# Patient Record
Sex: FEMALE
[De-identification: names, ages, dates, MRNs, and addresses within clinical notes are randomized per-mention and may not be internally consistent; named-entity substitution may affect disease eponyms.]

---

## 2023-05-31 ENCOUNTER — APPOINTMENT (OUTPATIENT)
Dept: NEUROLOGY | Facility: CLINIC | Age: 78
End: 2023-05-31
Payer: MEDICARE

## 2023-05-31 VITALS
WEIGHT: 128 LBS | HEART RATE: 96 BPM | HEIGHT: 62 IN | DIASTOLIC BLOOD PRESSURE: 71 MMHG | SYSTOLIC BLOOD PRESSURE: 121 MMHG | BODY MASS INDEX: 23.55 KG/M2

## 2023-05-31 DIAGNOSIS — R26.81 UNSTEADINESS ON FEET: ICD-10-CM

## 2023-05-31 DIAGNOSIS — R41.89 OTHER SYMPTOMS AND SIGNS INVOLVING COGNITIVE FUNCTIONS AND AWARENESS: ICD-10-CM

## 2023-05-31 PROBLEM — Z00.00 ENCOUNTER FOR PREVENTIVE HEALTH EXAMINATION: Status: ACTIVE | Noted: 2023-05-31

## 2023-05-31 PROCEDURE — ZZZZZ: CPT

## 2023-05-31 NOTE — ASSESSMENT
[FreeTextEntry1] : Cognitive Impairment / Unsteady Gait. \par \par - MRI of The Brain without contrast.\par - EEG.\par - Multivitamin Daily. \par - GAIT Training Fall prevention. \par - Blood Work - We will review what patients doctor sent her for.\par - I Will Follow up with her in One Month.\par \par \par \par \par \par I, Alisha Olivarez, Attest that this documentation has been prepared under the direction and in the presence of Provider Tonny Beltrán DO\par \par Thank You for letting me assist in the management of this patient. \par \par Tonny Beltrán DO\par Board Certified, Neurology\par

## 2023-05-31 NOTE — HISTORY OF PRESENT ILLNESS
[FreeTextEntry1] : Ms. Joe is a 78 year old woman who comes in for short term memory loss. This has been worsening over the course of last year. Patients appetite was suppressed for the last year as well. Patient is able to do at home activities. Patient takes Lexapro with no complaints. No recent imaging. No previous treatment. Patients depression level is a 2 out of 10. Patient does not o out of the house. Patient is not under the care with therapist or psychiatrist but was advised to follow up with one. Rule out secondary. She does not take daily vitamin supplement. Patient will start to exercise more. When it comes to sleep patient does not have good quality of sleep. She wakes up two times throughout her night. We will send her for fall prevention and gait training. \par Neurological Exam - Normal. \par - Anatomy for age decline and moderate depression.\par Patient does not show anatomy of dementia.

## 2023-07-10 ENCOUNTER — APPOINTMENT (OUTPATIENT)
Dept: NEUROLOGY | Facility: CLINIC | Age: 78
End: 2023-07-10

## 2023-07-13 ENCOUNTER — APPOINTMENT (OUTPATIENT)
Dept: NEUROLOGY | Facility: CLINIC | Age: 78
End: 2023-07-13

## 2025-02-20 ENCOUNTER — INPATIENT (INPATIENT)
Facility: HOSPITAL | Age: 80
LOS: 10 days | Discharge: HOME | DRG: 871 | End: 2025-03-03
Attending: INTERNAL MEDICINE | Admitting: INTERNAL MEDICINE
Payer: MEDICARE

## 2025-02-20 VITALS
TEMPERATURE: 99 F | RESPIRATION RATE: 24 BRPM | OXYGEN SATURATION: 96 % | WEIGHT: 130.07 LBS | HEIGHT: 62 IN | DIASTOLIC BLOOD PRESSURE: 70 MMHG | HEART RATE: 84 BPM | SYSTOLIC BLOOD PRESSURE: 120 MMHG

## 2025-02-20 LAB
ALBUMIN SERPL ELPH-MCNC: 4 G/DL — SIGNIFICANT CHANGE UP (ref 3.5–5.2)
ALP SERPL-CCNC: 94 U/L — SIGNIFICANT CHANGE UP (ref 30–115)
ALT FLD-CCNC: 12 U/L — SIGNIFICANT CHANGE UP (ref 0–41)
ANION GAP SERPL CALC-SCNC: 12 MMOL/L — SIGNIFICANT CHANGE UP (ref 7–14)
AST SERPL-CCNC: 31 U/L — SIGNIFICANT CHANGE UP (ref 0–41)
BASE EXCESS BLDV CALC-SCNC: 3 MMOL/L — SIGNIFICANT CHANGE UP (ref -2–3)
BASE EXCESS BLDV CALC-SCNC: 8.2 MMOL/L — HIGH (ref -2–3)
BASOPHILS # BLD AUTO: 0.02 K/UL — SIGNIFICANT CHANGE UP (ref 0–0.2)
BASOPHILS NFR BLD AUTO: 0.2 % — SIGNIFICANT CHANGE UP (ref 0–1)
BILIRUB SERPL-MCNC: 0.3 MG/DL — SIGNIFICANT CHANGE UP (ref 0.2–1.2)
BUN SERPL-MCNC: 33 MG/DL — HIGH (ref 10–20)
CA-I SERPL-SCNC: 1.17 MMOL/L — SIGNIFICANT CHANGE UP (ref 1.15–1.33)
CA-I SERPL-SCNC: 1.18 MMOL/L — SIGNIFICANT CHANGE UP (ref 1.15–1.33)
CALCIUM SERPL-MCNC: 8.7 MG/DL — SIGNIFICANT CHANGE UP (ref 8.4–10.4)
CHLORIDE SERPL-SCNC: 96 MMOL/L — LOW (ref 98–110)
CO2 SERPL-SCNC: 31 MMOL/L — SIGNIFICANT CHANGE UP (ref 17–32)
CREAT SERPL-MCNC: 1.6 MG/DL — HIGH (ref 0.7–1.5)
EGFR: 32 ML/MIN/1.73M2 — LOW
EGFR: 32 ML/MIN/1.73M2 — LOW
EOSINOPHIL # BLD AUTO: 0.01 K/UL — SIGNIFICANT CHANGE UP (ref 0–0.7)
EOSINOPHIL NFR BLD AUTO: 0.1 % — SIGNIFICANT CHANGE UP (ref 0–8)
FLUAV AG NPH QL: SIGNIFICANT CHANGE UP
FLUBV AG NPH QL: SIGNIFICANT CHANGE UP
GAS PNL BLDV: 135 MMOL/L — LOW (ref 136–145)
GAS PNL BLDV: 137 MMOL/L — SIGNIFICANT CHANGE UP (ref 136–145)
GAS PNL BLDV: SIGNIFICANT CHANGE UP
GLUCOSE SERPL-MCNC: 113 MG/DL — HIGH (ref 70–99)
HCO3 BLDV-SCNC: 37 MMOL/L — HIGH (ref 22–29)
HCO3 BLDV-SCNC: 39 MMOL/L — HIGH (ref 22–29)
HCT VFR BLD CALC: 43.3 % — SIGNIFICANT CHANGE UP (ref 37–47)
HCT VFR BLDA CALC: 38 % — SIGNIFICANT CHANGE UP (ref 34.5–46.5)
HGB BLD CALC-MCNC: 12.5 G/DL — SIGNIFICANT CHANGE UP (ref 11.7–16.1)
HGB BLD-MCNC: 13 G/DL — SIGNIFICANT CHANGE UP (ref 12–16)
IMM GRANULOCYTES NFR BLD AUTO: 0.4 % — HIGH (ref 0.1–0.3)
LACTATE BLDV-MCNC: 1.6 MMOL/L — SIGNIFICANT CHANGE UP (ref 0.5–2)
LACTATE BLDV-MCNC: 2.4 MMOL/L — HIGH (ref 0.5–2)
LYMPHOCYTES # BLD AUTO: 0.65 K/UL — LOW (ref 1.2–3.4)
LYMPHOCYTES # BLD AUTO: 6.1 % — LOW (ref 20.5–51.1)
MCHC RBC-ENTMCNC: 27.1 PG — SIGNIFICANT CHANGE UP (ref 27–31)
MCHC RBC-ENTMCNC: 30 G/DL — LOW (ref 32–37)
MCV RBC AUTO: 90.2 FL — SIGNIFICANT CHANGE UP (ref 81–99)
MONOCYTES # BLD AUTO: 0.85 K/UL — HIGH (ref 0.1–0.6)
MONOCYTES NFR BLD AUTO: 7.9 % — SIGNIFICANT CHANGE UP (ref 1.7–9.3)
NEUTROPHILS # BLD AUTO: 9.13 K/UL — HIGH (ref 1.4–6.5)
NEUTROPHILS NFR BLD AUTO: 85.3 % — HIGH (ref 42.2–75.2)
NRBC BLD AUTO-RTO: 0 /100 WBCS — SIGNIFICANT CHANGE UP (ref 0–0)
NT-PROBNP SERPL-SCNC: 2979 PG/ML — HIGH (ref 0–300)
PCO2 BLDV: 118 MMHG — CRITICAL HIGH (ref 39–42)
PCO2 BLDV: 96 MMHG — CRITICAL HIGH (ref 39–42)
PH BLDV: 7.1 — CRITICAL LOW (ref 7.32–7.43)
PH BLDV: 7.22 — LOW (ref 7.32–7.43)
PLATELET # BLD AUTO: 198 K/UL — SIGNIFICANT CHANGE UP (ref 130–400)
PMV BLD: 9 FL — SIGNIFICANT CHANGE UP (ref 7.4–10.4)
PO2 BLDV: 29 MMHG — SIGNIFICANT CHANGE UP (ref 25–45)
PO2 BLDV: 59 MMHG — HIGH (ref 25–45)
POTASSIUM BLDV-SCNC: 4.7 MMOL/L — SIGNIFICANT CHANGE UP (ref 3.5–5.1)
POTASSIUM BLDV-SCNC: 5.7 MMOL/L — HIGH (ref 3.5–5.1)
POTASSIUM SERPL-MCNC: 5.2 MMOL/L — HIGH (ref 3.5–5)
POTASSIUM SERPL-SCNC: 5.2 MMOL/L — HIGH (ref 3.5–5)
PROT SERPL-MCNC: 7.2 G/DL — SIGNIFICANT CHANGE UP (ref 6–8)
RBC # BLD: 4.8 M/UL — SIGNIFICANT CHANGE UP (ref 4.2–5.4)
RBC # FLD: 13.1 % — SIGNIFICANT CHANGE UP (ref 11.5–14.5)
RSV RNA NPH QL NAA+NON-PROBE: DETECTED
SAO2 % BLDV: 44.4 % — LOW (ref 67–88)
SARS-COV-2 RNA SPEC QL NAA+PROBE: SIGNIFICANT CHANGE UP
SODIUM SERPL-SCNC: 139 MMOL/L — SIGNIFICANT CHANGE UP (ref 135–146)
TROPONIN T, HIGH SENSITIVITY RESULT: 35 NG/L — HIGH (ref 6–13)
WBC # BLD: 10.7 K/UL — SIGNIFICANT CHANGE UP (ref 4.8–10.8)
WBC # FLD AUTO: 10.7 K/UL — SIGNIFICANT CHANGE UP (ref 4.8–10.8)

## 2025-02-20 PROCEDURE — 70450 CT HEAD/BRAIN W/O DYE: CPT | Mod: 26

## 2025-02-20 PROCEDURE — 93010 ELECTROCARDIOGRAM REPORT: CPT

## 2025-02-20 PROCEDURE — 99291 CRITICAL CARE FIRST HOUR: CPT

## 2025-02-20 PROCEDURE — 76604 US EXAM CHEST: CPT | Mod: 26

## 2025-02-20 PROCEDURE — 71045 X-RAY EXAM CHEST 1 VIEW: CPT | Mod: 26

## 2025-02-20 PROCEDURE — 71275 CT ANGIOGRAPHY CHEST: CPT | Mod: 26

## 2025-02-20 PROCEDURE — 93308 TTE F-UP OR LMTD: CPT | Mod: 26

## 2025-02-20 RX ORDER — LORAZEPAM 4 MG/ML
2 VIAL (ML) INJECTION ONCE
Refills: 0 | Status: DISCONTINUED | OUTPATIENT
Start: 2025-02-20 | End: 2025-02-20

## 2025-02-20 RX ORDER — ONDANSETRON HCL/PF 4 MG/2 ML
4 VIAL (ML) INJECTION ONCE
Refills: 0 | Status: COMPLETED | OUTPATIENT
Start: 2025-02-20 | End: 2025-02-20

## 2025-02-20 RX ORDER — SODIUM CHLORIDE 9 G/1000ML
1000 INJECTION, SOLUTION INTRAVENOUS ONCE
Refills: 0 | Status: COMPLETED | OUTPATIENT
Start: 2025-02-20 | End: 2025-02-20

## 2025-02-20 RX ORDER — DEXMEDETOMIDINE HYDROCHLORIDE IN SODIUM CHLORIDE 4 UG/ML
0.2 INJECTION INTRAVENOUS
Qty: 400 | Refills: 0 | Status: DISCONTINUED | OUTPATIENT
Start: 2025-02-20 | End: 2025-02-20

## 2025-02-20 RX ORDER — DEXAMETHASONE 0.5 MG/1
10 TABLET ORAL ONCE
Refills: 0 | Status: COMPLETED | OUTPATIENT
Start: 2025-02-20 | End: 2025-02-20

## 2025-02-20 RX ORDER — IPRATROPIUM BROMIDE AND ALBUTEROL SULFATE .5; 2.5 MG/3ML; MG/3ML
3 SOLUTION RESPIRATORY (INHALATION)
Refills: 0 | Status: COMPLETED | OUTPATIENT
Start: 2025-02-20 | End: 2025-02-20

## 2025-02-20 RX ADMIN — SODIUM CHLORIDE 1000 MILLILITER(S): 9 INJECTION, SOLUTION INTRAVENOUS at 22:33

## 2025-02-20 RX ADMIN — Medication 2 MILLIGRAM(S): at 21:30

## 2025-02-20 RX ADMIN — DEXAMETHASONE 10 MILLIGRAM(S): 0.5 TABLET ORAL at 20:42

## 2025-02-20 RX ADMIN — Medication 4 MILLIGRAM(S): at 21:30

## 2025-02-20 RX ADMIN — Medication 4 MILLILITER(S): at 20:41

## 2025-02-20 RX ADMIN — IPRATROPIUM BROMIDE AND ALBUTEROL SULFATE 3 MILLILITER(S): .5; 2.5 SOLUTION RESPIRATORY (INHALATION) at 22:35

## 2025-02-20 RX ADMIN — IPRATROPIUM BROMIDE AND ALBUTEROL SULFATE 3 MILLILITER(S): .5; 2.5 SOLUTION RESPIRATORY (INHALATION) at 21:57

## 2025-02-20 RX ADMIN — IPRATROPIUM BROMIDE AND ALBUTEROL SULFATE 3 MILLILITER(S): .5; 2.5 SOLUTION RESPIRATORY (INHALATION) at 23:05

## 2025-02-20 NOTE — ED PROVIDER NOTE - ATTENDING CONTRIBUTION TO CARE
pt with shortness of breath 2/2 most likely viral, hypoxic to 80s    on NC    Well appearing, NAD, non toxic. NCAT PERRLA EOMI neck supple non tender normal wob cta bl rrr abdomen s nt nd no rebound no guarding WWPx4 neuro non focal    VBG, oxygen, infectious workup

## 2025-02-20 NOTE — ED ADULT NURSE NOTE - ALCOHOL PRE SCREEN (AUDIT - C)
Pt admits to difficulty falling and staying asleep. Has frequent awakenings and has trouble falling back to sleep. No witnessed apneic episodes. No new vitamins or supplements. No increased anxiety. Does not use phone/screens/tv before bed. Tries reading, meditation, low lighting.    Statement Selected

## 2025-02-20 NOTE — ED PROVIDER NOTE - PROGRESS NOTE DETAILS
Received signout from Dr. Shankar.  Patient had an episode of hypoxia in the main ED.  Repeat VBG shows acute hypercarbia placed on BiPAP and brought over to critical care area for further management.  Patient also received Ativan for prior to upgrade.   Comfortable on BiPAP at this time. Thomas Pierce, DO: Patient initially had nasal cannula on and laying on gurney and alert and oriented x 4.  Was found later to have climbed out of her bed sitting on the chair with nasal cannula removed satting 60% on room air with good waveform, agitated, altered, pale.  Required assistance back to [place] and required 2 mg Ativan for agitation.  Repeat VBG found to be hypercapnic.  BiPAP with Precedex initiated.  Found to be RSV positive. Patient signed out to Dr. Diaz pending labs, imaging and reassessment.

## 2025-02-20 NOTE — ED PROVIDER NOTE - OBJECTIVE STATEMENT
79-year-old female with past medical history of hypertension, who presents sent from urgent care for hypoxia in the setting of nasal congestion, cough, increasing shortness of breath over the past week.  Patient was initially urgent care to be evaluated with negative rapid flu and COVID.  Notably patient was satting 80% on room air there and improved on 3 L nasal cannula.  Patient denies any fevers, nausea/vomiting, chest pain, urinary symptoms, leg swelling.

## 2025-02-20 NOTE — ED PROVIDER NOTE - CLINICAL SUMMARY MEDICAL DECISION MAKING FREE TEXT BOX
pt s/o to me from Dr. Rendon- +hypercapneic and hypoxic resp failure. no pe. no pna. on bipap. dw icu, will admit for further eval

## 2025-02-20 NOTE — ED PROVIDER NOTE - PHYSICAL EXAMINATION
Initial vital signs reviewed.  General: NAD, nontoxic appearing.  HENT: AT/NC.  Eyes: non-injected conjunctivae b/l.  Neck: supple.  CV: RRR, no murmurs. 2+ distal pulses x4.  Pulm: nonlabored work of breathing, CTAB. Initially satting 90% on room air improved to 95% 3 L nasal cannula.  Abd: soft, nondistended, nontender.  MSK: no joint deformity. no leg swelling  Skin: warm, dry, well-perfused.  Neuro: A&Ox4.  Psych: appropriate mood and affect.

## 2025-02-20 NOTE — ED PROVIDER NOTE - CARE PLAN
Principal Discharge DX:	Acute respiratory failure with hypoxia and hypercapnia  Secondary Diagnosis:	RSV infection   1

## 2025-02-20 NOTE — ED ADULT NURSE REASSESSMENT NOTE - NS ED NURSE REASSESS COMMENT FT1
Pt was found in the chair by her bed without her nasal cannula by her daughter who stepped out for xray to be done in the room. Her SpO2 was in the 60s and pt was acting erratic and confused. Bed alarm was active and on and pt was connected to monitor. Nurse and MD helped pt back to bed but she was uncooperative. Placed a nonrebreather on her and gave her 2mg ativan IV. Pt's Spo2 increased to 100% and she was calm. Upgraded to crit and planned for bipap.

## 2025-02-20 NOTE — ED ADULT NURSE NOTE - NSFALLUNIVINTERV_ED_ALL_ED
Bed/Stretcher in lowest position, wheels locked, appropriate side rails in place/Call bell, personal items and telephone in reach/Instruct patient to call for assistance before getting out of bed/chair/stretcher/Non-slip footwear applied when patient is off stretcher/[place] to call system/Physically safe environment - no spills, clutter or unnecessary equipment/Purposeful proactive rounding/Room/bathroom lighting operational, light cord in reach

## 2025-02-20 NOTE — ED ADULT TRIAGE NOTE - CHIEF COMPLAINT QUOTE
GILL from city MD for shortness of breath.  per patient niece, she been sick for 5 days runny nose cough and congestion.  per EMS pt O2 was 80 on RA, pt is sating 88 on RA placed back on 3L NC went up 95

## 2025-02-21 ENCOUNTER — RESULT REVIEW (OUTPATIENT)
Age: 80
End: 2025-02-21

## 2025-02-21 DIAGNOSIS — J96.01 ACUTE RESPIRATORY FAILURE WITH HYPOXIA: ICD-10-CM

## 2025-02-21 LAB
ALBUMIN SERPL ELPH-MCNC: 3.8 G/DL — SIGNIFICANT CHANGE UP (ref 3.5–5.2)
ALP SERPL-CCNC: 84 U/L — SIGNIFICANT CHANGE UP (ref 30–115)
ALT FLD-CCNC: 18 U/L — SIGNIFICANT CHANGE UP (ref 0–41)
ANION GAP SERPL CALC-SCNC: 10 MMOL/L — SIGNIFICANT CHANGE UP (ref 7–14)
AST SERPL-CCNC: 27 U/L — SIGNIFICANT CHANGE UP (ref 0–41)
BASE EXCESS BLDV CALC-SCNC: 1 MMOL/L — SIGNIFICANT CHANGE UP (ref -2–3)
BASOPHILS # BLD AUTO: 0.01 K/UL — SIGNIFICANT CHANGE UP (ref 0–0.2)
BASOPHILS NFR BLD AUTO: 0.1 % — SIGNIFICANT CHANGE UP (ref 0–1)
BILIRUB SERPL-MCNC: <0.2 MG/DL — SIGNIFICANT CHANGE UP (ref 0.2–1.2)
BUN SERPL-MCNC: 33 MG/DL — HIGH (ref 10–20)
CA-I SERPL-SCNC: 1.13 MMOL/L — LOW (ref 1.15–1.33)
CALCIUM SERPL-MCNC: 8.3 MG/DL — LOW (ref 8.4–10.5)
CHLORIDE SERPL-SCNC: 98 MMOL/L — SIGNIFICANT CHANGE UP (ref 98–110)
CO2 SERPL-SCNC: 29 MMOL/L — SIGNIFICANT CHANGE UP (ref 17–32)
CREAT SERPL-MCNC: 1.4 MG/DL — SIGNIFICANT CHANGE UP (ref 0.7–1.5)
EGFR: 38 ML/MIN/1.73M2 — LOW
EGFR: 38 ML/MIN/1.73M2 — LOW
EOSINOPHIL # BLD AUTO: 0 K/UL — SIGNIFICANT CHANGE UP (ref 0–0.7)
EOSINOPHIL NFR BLD AUTO: 0 % — SIGNIFICANT CHANGE UP (ref 0–8)
GAS PNL BLDA: SIGNIFICANT CHANGE UP
GAS PNL BLDV: 132 MMOL/L — LOW (ref 136–145)
GAS PNL BLDV: SIGNIFICANT CHANGE UP
GLUCOSE BLDC GLUCOMTR-MCNC: 142 MG/DL — HIGH (ref 70–99)
GLUCOSE BLDC GLUCOMTR-MCNC: 97 MG/DL — SIGNIFICANT CHANGE UP (ref 70–99)
GLUCOSE BLDC GLUCOMTR-MCNC: 99 MG/DL — SIGNIFICANT CHANGE UP (ref 70–99)
GLUCOSE SERPL-MCNC: 136 MG/DL — HIGH (ref 70–99)
HCO3 BLDV-SCNC: 34 MMOL/L — HIGH (ref 22–29)
HCT VFR BLD CALC: 40.9 % — SIGNIFICANT CHANGE UP (ref 37–47)
HCT VFR BLDA CALC: 52 % — HIGH (ref 34.5–46.5)
HGB BLD CALC-MCNC: 17.4 G/DL — HIGH (ref 11.7–16.1)
HGB BLD-MCNC: 12 G/DL — SIGNIFICANT CHANGE UP (ref 12–16)
IMM GRANULOCYTES NFR BLD AUTO: 1 % — HIGH (ref 0.1–0.3)
LACTATE BLDV-MCNC: 2.1 MMOL/L — HIGH (ref 0.5–2)
LYMPHOCYTES # BLD AUTO: 0.53 K/UL — LOW (ref 1.2–3.4)
LYMPHOCYTES # BLD AUTO: 6.4 % — LOW (ref 20.5–51.1)
MAGNESIUM SERPL-MCNC: 2.1 MG/DL — SIGNIFICANT CHANGE UP (ref 1.8–2.4)
MCHC RBC-ENTMCNC: 26.8 PG — LOW (ref 27–31)
MCHC RBC-ENTMCNC: 29.3 G/DL — LOW (ref 32–37)
MCV RBC AUTO: 91.3 FL — SIGNIFICANT CHANGE UP (ref 81–99)
MONOCYTES # BLD AUTO: 0.16 K/UL — SIGNIFICANT CHANGE UP (ref 0.1–0.6)
MONOCYTES NFR BLD AUTO: 1.9 % — SIGNIFICANT CHANGE UP (ref 1.7–9.3)
MRSA PCR RESULT.: NEGATIVE — SIGNIFICANT CHANGE UP
NEUTROPHILS # BLD AUTO: 7.49 K/UL — HIGH (ref 1.4–6.5)
NEUTROPHILS NFR BLD AUTO: 90.6 % — HIGH (ref 42.2–75.2)
NRBC BLD AUTO-RTO: 0 /100 WBCS — SIGNIFICANT CHANGE UP (ref 0–0)
PCO2 BLDV: 92 MMHG — CRITICAL HIGH (ref 39–42)
PH BLDV: 7.17 — CRITICAL LOW (ref 7.32–7.43)
PLATELET # BLD AUTO: 195 K/UL — SIGNIFICANT CHANGE UP (ref 130–400)
PMV BLD: 9.3 FL — SIGNIFICANT CHANGE UP (ref 7.4–10.4)
PO2 BLDV: 32 MMHG — SIGNIFICANT CHANGE UP (ref 25–45)
POTASSIUM BLDV-SCNC: 4.8 MMOL/L — SIGNIFICANT CHANGE UP (ref 3.5–5.1)
POTASSIUM SERPL-MCNC: 5.1 MMOL/L — HIGH (ref 3.5–5)
POTASSIUM SERPL-SCNC: 5.1 MMOL/L — HIGH (ref 3.5–5)
PROT SERPL-MCNC: 6.3 G/DL — SIGNIFICANT CHANGE UP (ref 6–8)
RBC # BLD: 4.48 M/UL — SIGNIFICANT CHANGE UP (ref 4.2–5.4)
RBC # FLD: 13 % — SIGNIFICANT CHANGE UP (ref 11.5–14.5)
SAO2 % BLDV: 42.5 % — LOW (ref 67–88)
SODIUM SERPL-SCNC: 137 MMOL/L — SIGNIFICANT CHANGE UP (ref 135–146)
TROPONIN T, HIGH SENSITIVITY RESULT: 25 NG/L — HIGH (ref 6–13)
TSH SERPL-MCNC: 0.3 UIU/ML — SIGNIFICANT CHANGE UP (ref 0.27–4.2)
WBC # BLD: 8.27 K/UL — SIGNIFICANT CHANGE UP (ref 4.8–10.8)
WBC # FLD AUTO: 8.27 K/UL — SIGNIFICANT CHANGE UP (ref 4.8–10.8)

## 2025-02-21 PROCEDURE — 71045 X-RAY EXAM CHEST 1 VIEW: CPT | Mod: 26

## 2025-02-21 PROCEDURE — 97116 GAIT TRAINING THERAPY: CPT | Mod: GP

## 2025-02-21 PROCEDURE — 86850 RBC ANTIBODY SCREEN: CPT

## 2025-02-21 PROCEDURE — 80053 COMPREHEN METABOLIC PANEL: CPT

## 2025-02-21 PROCEDURE — 36415 COLL VENOUS BLD VENIPUNCTURE: CPT

## 2025-02-21 PROCEDURE — 86900 BLOOD TYPING SEROLOGIC ABO: CPT

## 2025-02-21 PROCEDURE — 85014 HEMATOCRIT: CPT

## 2025-02-21 PROCEDURE — 82803 BLOOD GASES ANY COMBINATION: CPT

## 2025-02-21 PROCEDURE — 84484 ASSAY OF TROPONIN QUANT: CPT

## 2025-02-21 PROCEDURE — 87640 STAPH A DNA AMP PROBE: CPT

## 2025-02-21 PROCEDURE — 82330 ASSAY OF CALCIUM: CPT

## 2025-02-21 PROCEDURE — 87641 MR-STAPH DNA AMP PROBE: CPT

## 2025-02-21 PROCEDURE — 80048 BASIC METABOLIC PNL TOTAL CA: CPT

## 2025-02-21 PROCEDURE — 93306 TTE W/DOPPLER COMPLETE: CPT

## 2025-02-21 PROCEDURE — 86901 BLOOD TYPING SEROLOGIC RH(D): CPT

## 2025-02-21 PROCEDURE — 71045 X-RAY EXAM CHEST 1 VIEW: CPT

## 2025-02-21 PROCEDURE — 84443 ASSAY THYROID STIM HORMONE: CPT

## 2025-02-21 PROCEDURE — 93306 TTE W/DOPPLER COMPLETE: CPT | Mod: 26

## 2025-02-21 PROCEDURE — 84295 ASSAY OF SERUM SODIUM: CPT

## 2025-02-21 PROCEDURE — 83605 ASSAY OF LACTIC ACID: CPT

## 2025-02-21 PROCEDURE — 97530 THERAPEUTIC ACTIVITIES: CPT | Mod: GP

## 2025-02-21 PROCEDURE — 85018 HEMOGLOBIN: CPT

## 2025-02-21 PROCEDURE — 82962 GLUCOSE BLOOD TEST: CPT

## 2025-02-21 PROCEDURE — 97162 PT EVAL MOD COMPLEX 30 MIN: CPT | Mod: GP

## 2025-02-21 PROCEDURE — 85025 COMPLETE CBC W/AUTO DIFF WBC: CPT

## 2025-02-21 PROCEDURE — 83735 ASSAY OF MAGNESIUM: CPT

## 2025-02-21 PROCEDURE — 93005 ELECTROCARDIOGRAM TRACING: CPT

## 2025-02-21 PROCEDURE — 85027 COMPLETE CBC AUTOMATED: CPT

## 2025-02-21 PROCEDURE — 84132 ASSAY OF SERUM POTASSIUM: CPT

## 2025-02-21 PROCEDURE — 94640 AIRWAY INHALATION TREATMENT: CPT

## 2025-02-21 PROCEDURE — 84100 ASSAY OF PHOSPHORUS: CPT

## 2025-02-21 PROCEDURE — 99291 CRITICAL CARE FIRST HOUR: CPT | Mod: GC

## 2025-02-21 PROCEDURE — 94660 CPAP INITIATION&MGMT: CPT

## 2025-02-21 RX ORDER — IPRATROPIUM BROMIDE AND ALBUTEROL SULFATE .5; 2.5 MG/3ML; MG/3ML
3 SOLUTION RESPIRATORY (INHALATION) EVERY 6 HOURS
Refills: 0 | Status: DISCONTINUED | OUTPATIENT
Start: 2025-02-21 | End: 2025-03-03

## 2025-02-21 RX ORDER — ENOXAPARIN SODIUM 100 MG/ML
30 INJECTION SUBCUTANEOUS EVERY 24 HOURS
Refills: 0 | Status: DISCONTINUED | OUTPATIENT
Start: 2025-02-21 | End: 2025-03-03

## 2025-02-21 RX ORDER — CALCIUM GLUCONATE 20 MG/ML
1 INJECTION, SOLUTION INTRAVENOUS ONCE
Refills: 0 | Status: COMPLETED | OUTPATIENT
Start: 2025-02-21 | End: 2025-02-21

## 2025-02-21 RX ORDER — METHYLPREDNISOLONE ACETATE 80 MG/ML
40 INJECTION, SUSPENSION INTRA-ARTICULAR; INTRALESIONAL; INTRAMUSCULAR; SOFT TISSUE
Refills: 0 | Status: DISCONTINUED | OUTPATIENT
Start: 2025-02-21 | End: 2025-02-23

## 2025-02-21 RX ORDER — SODIUM CHLORIDE 9 G/1000ML
500 INJECTION, SOLUTION INTRAVENOUS ONCE
Refills: 0 | Status: COMPLETED | OUTPATIENT
Start: 2025-02-21 | End: 2025-02-21

## 2025-02-21 RX ORDER — ESCITALOPRAM OXALATE 20 MG/1
1 TABLET ORAL
Refills: 0 | DISCHARGE

## 2025-02-21 RX ORDER — ALBUTEROL SULFATE 2.5 MG/3ML
2.5 VIAL, NEBULIZER (ML) INHALATION ONCE
Refills: 0 | Status: COMPLETED | OUTPATIENT
Start: 2025-02-21 | End: 2025-02-21

## 2025-02-21 RX ORDER — NOREPINEPHRINE BITARTRATE 8 MG
0.05 SOLUTION INTRAVENOUS
Qty: 8 | Refills: 0 | Status: DISCONTINUED | OUTPATIENT
Start: 2025-02-21 | End: 2025-02-25

## 2025-02-21 RX ADMIN — IPRATROPIUM BROMIDE AND ALBUTEROL SULFATE 3 MILLILITER(S): .5; 2.5 SOLUTION RESPIRATORY (INHALATION) at 21:15

## 2025-02-21 RX ADMIN — Medication 1 DOSE(S): at 14:04

## 2025-02-21 RX ADMIN — CALCIUM GLUCONATE 100 GRAM(S): 20 INJECTION, SOLUTION INTRAVENOUS at 02:02

## 2025-02-21 RX ADMIN — ENOXAPARIN SODIUM 30 MILLIGRAM(S): 100 INJECTION SUBCUTANEOUS at 10:26

## 2025-02-21 RX ADMIN — SODIUM CHLORIDE 500 MILLILITER(S): 9 INJECTION, SOLUTION INTRAVENOUS at 05:23

## 2025-02-21 RX ADMIN — Medication 2.5 MILLIGRAM(S): at 02:03

## 2025-02-21 RX ADMIN — IPRATROPIUM BROMIDE AND ALBUTEROL SULFATE 3 MILLILITER(S): .5; 2.5 SOLUTION RESPIRATORY (INHALATION) at 08:37

## 2025-02-21 RX ADMIN — Medication 40 MILLIGRAM(S): at 14:04

## 2025-02-21 RX ADMIN — IPRATROPIUM BROMIDE AND ALBUTEROL SULFATE 3 MILLILITER(S): .5; 2.5 SOLUTION RESPIRATORY (INHALATION) at 15:39

## 2025-02-21 RX ADMIN — NOREPINEPHRINE BITARTRATE 5.53 MICROGRAM(S)/KG/MIN: 8 SOLUTION at 03:03

## 2025-02-21 RX ADMIN — METHYLPREDNISOLONE ACETATE 40 MILLIGRAM(S): 80 INJECTION, SUSPENSION INTRA-ARTICULAR; INTRALESIONAL; INTRAMUSCULAR; SOFT TISSUE at 18:22

## 2025-02-21 RX ADMIN — METHYLPREDNISOLONE ACETATE 40 MILLIGRAM(S): 80 INJECTION, SUSPENSION INTRA-ARTICULAR; INTRALESIONAL; INTRAMUSCULAR; SOFT TISSUE at 05:22

## 2025-02-21 RX ADMIN — Medication 1 APPLICATION(S): at 14:05

## 2025-02-21 NOTE — PATIENT PROFILE ADULT - NSPROPATIENTLACTATING_GEN_A_NUR
TRIAGE NOTE  Pt AOx3 and came to ED from home with c/o flu like symptoms +chest pain and SOB for past two days.  Pt also c/o fever at home - TMAX of 103F +chills.   Pt c/o productive cough with yellow sputum.   Pt c/o mid-sternal chest pain that radiates to back - mostly when pt coughs.   Pt also c/o SOB with coughing.   Denies sick contacts.   Pt not vaccinated with COVID.   Pt took 2 tabs of Advil prior to coming to ED.    patient is A&O x0  on the bipap

## 2025-02-21 NOTE — PROGRESS NOTE ADULT - SUBJECTIVE AND OBJECTIVE BOX
SUBJECTIVE/OVERNIGHT EVENTS  Today is hospital day . This morning patient was seen and examined at bedside, resting comfortably in bed. No acute or major events overnight.    MEDICATIONS  STANDING MEDICATIONS  albuterol/ipratropium for Nebulization 3 milliLiter(s) Nebulizer every 6 hours  chlorhexidine 2% Cloths 1 Application(s) Topical daily  enoxaparin Injectable 30 milliGRAM(s) SubCutaneous every 24 hours  fluticasone propionate/ salmeterol 250-50 MICROgram(s) Diskus 1 Dose(s) Inhalation two times a day  methylPREDNISolone sodium succinate Injectable 40 milliGRAM(s) IV Push two times a day  norepinephrine Infusion 0.05 MICROgram(s)/kG/Min IV Continuous <Continuous>  pantoprazole  Injectable 40 milliGRAM(s) IV Push daily    PRN MEDICATIONS    VITALS  T(F): 97.8 (02-21-25 @ 08:00), Max: 98.8 (02-20-25 @ 19:35)  HR: 59 (02-21-25 @ 10:00) (50 - 108)  BP: 89/53 (02-21-25 @ 10:00) (69/33 - 159/66)  RR: 17 (02-21-25 @ 10:00) (14 - 35)  SpO2: 100% (02-21-25 @ 10:00) (93% - 100%)  POCT Blood Glucose.: 142 mg/dL (02-21-25 @ 03:01)  POCT Blood Glucose.: 155 mg/dL (02-20-25 @ 21:21)    PHYSICAL EXAM  GENERAL  ( + ) NAD, lying in bed comfortably     (  ) obtunded     (  ) lethargic     (  ) somnolent    HEAD  ( + ) Atraumatic     (  ) hematoma     (  ) laceration (specify location:       )     NECK  (+  ) Supple     (  ) neck stiffness     (  ) nuchal rigidity     (  )  no JVD     (  ) JVD present ( -- cm)    HEART  Rate -->  ( + ) normal rate    (  ) bradycardic    (  ) tachycardic  Rhythm -->  (+  ) regular    (  ) regularly irregular    (  ) irregularly irregular  Murmurs -->  (  ) normal s1/s2    (  ) systolic murmur    (  ) diastolic murmur    (  ) continuous murmur     (  ) S3 present    (  ) S4 present    LUNGS  (+  )Unlabored respirations     (  ) tachypnea  (  ) B/L air entry     (  ) decreased breath sounds in:  (location     )    (  ) no adventitious sound     ( + ) crackles     (  ) wheezing      (  ) rhonchi      (specify location:       )  (  ) chest wall tenderness (specify location:       )    ABDOMEN  ( + ) Soft     (  ) tense   |   (  ) nondistended     (  ) distended   |   (  ) +BS     (  ) hypoactive bowel sounds     (  ) hyperactive bowel sounds  ( + ) nontender     (  ) RUQ tenderness     (  ) RLQ tenderness     (  ) LLQ tenderness     (  ) epigastric tenderness     (  ) diffuse tenderness  (  ) Splenomegaly      (  ) Hepatomegaly      (  ) Jaundice     (  ) ecchymosis     EXTREMITIES  ( + ) Normal     (  ) Rash     (  ) ecchymosis     (  ) varicose veins      (  ) pitting edema     (  ) non-pitting edema   (  ) ulceration     (  ) gangrene:     (location:     )    NERVOUS SYSTEM  (+  ) A&Ox3     (  ) confused     (  ) lethargic  CN II-XII:     (  ) Intact     (  ) focal deficits  (Specify:     )   Upper extremities:     (  ) strength X/5     (  ) focal deficit (specify:    )  Lower extremities:     (  ) strength  X/5    (  ) focal deficit (specify:    )    SKIN  ( + ) No rashes or lesions     (  ) maculopapular rash     (  ) pustules     (  ) vesicles     (  ) ulcer     (  ) ecchymosis     (specify location:     )    LABS             13.0   10.70 )-----------( 198      ( 02-20-25 @ 21:34 )             43.3     139  |  96  |  33  -------------------------<  113   02-20-25 @ 21:34  5.2  |  31  |  1.6    Ca      8.7     02-20-25 @ 21:34    TPro  7.2  /  Alb  4.0  /  TBili  0.3  /  DBili  x   /  AST  31  /  ALT  12  /  AlkPhos  94  /  GGT  x     02-20-25 @ 21:34    Troponin T, High Sensitivity Result: 35 ng/L (02-20-25 @ 22:11)  Pro-Brain Natriuretic Peptide: 2979 pg/mL (02-20-25 @ 22:11)    Urinalysis Basic - ( 20 Feb 2025 21:34 )    Color: x / Appearance: x / SG: x / pH: x  Gluc: 113 mg/dL / Ketone: x  / Bili: x / Urobili: x   Blood: x / Protein: x / Nitrite: x   Leuk Esterase: x / RBC: x / WBC x   Sq Epi: x / Non Sq Epi: x / Bacteria: x    ABG - ( 21 Feb 2025 04:52 )  pH, Arterial: 7.31  pH, Blood: x     /  pCO2: 64    /  pO2: 133   / HCO3: 32    / Base Excess: 4.0   /  SaO2: 99.9

## 2025-02-21 NOTE — H&P ADULT - HISTORY OF PRESENT ILLNESS
79-year-old female with past medical history of hypertension, chronic high bicarb, who presents sent from urgent care for hypoxia in the setting of nasal congestion, cough, increasing shortness of breath over the past week.  Patient was initially urgent care to be evaluated with negative rapid flu and COVID.  Notably patient was satting 80% on room air there and improved on 3 L nasal cannula. While in ED pt was on room air for a little and satted in 80's and was obtunded. BIPAP placed with improvement.  Patient denies any fevers, nausea/vomiting, chest pain, urinary symptoms, leg swelling.    VS all wnl except hypoxia  trop 35, creat 1.6, bnp 2979 with no baseline, bicarb 31 chronic, lacate 2.8, vbg pH 7.3 to 7.1 to 7.2, pco2 from 50 to 118 to 96, RSV positive  CT head and abd negative  CXR hyperinflated  received LR bolus, lorazepam, calcium gluconate, duoneb and dexa 79-year-old female with past medical history of hypertension, chronic high bicarb (COPD?/smoker?), who came from urgent care for hypoxia in the setting of nasal congestion, cough, increasing shortness of breath over the past week. Patient was initially seen in urgent care to be evaluated with negative rapid flu and COVID. Notably patient was satting 80% on room air there and improved on 3 L nasal cannula. While in ED pt was taken off room air for a little and satted in 80's and was obtunded and agitated and SOB. Lorazepam 2 mg given. BIPAP placed with improvement in SOB but sleepy.  ED interview: Patient denies any fevers, nausea/vomiting, chest pain, urinary symptoms, leg swelling. My interview: Pt not waking up to sternal rub so could not conduct interview, likely from ativan vs co2 narcosis.      VS all wnl except hypoxia  trop 35, creat 1.6, bnp 2979 with no baseline, bicarb 31 chronic, lacate 2.8, vbg pH 7.3 to 7.1 to 7.2, pco2 from 50 to 118 to 96, RSV positive  CT head and abd negative  CXR hyperinflated  received LR bolus, lorazepam, calcium gluconate, duoneb and dexa 79-year-old female with past medical history of hypertension, chronic high bicarb (COPD?/smoker?), who came from urgent care for hypoxia in the setting of nasal congestion, cough, increasing shortness of breath over the past week. Patient was initially seen in urgent care to be evaluated with negative rapid flu and COVID. Notably patient was satting 80% on room air there and improved on 3 L nasal cannula. While in ED pt was taken off room air for a little and satted in 80's and was obtunded and agitated and SOB. Lorazepam 2 mg given. BIPAP placed with improvement in SOB but sleepy.  ED interview: Patient denies any fevers, nausea/vomiting, chest pain, urinary symptoms, leg swelling. My interview: Pt not waking up to sternal rub so could not conduct interview, likely from ativan vs co2 narcosis.      VS all wnl except hypoxia, then hypotensive started on levo  trop 35, creat 1.6, bnp 2979 with no baseline, bicarb 31 chronic, lacate 2.8, vbg pH 7.3 to 7.1 to 7.2, pco2 from 50 to 118 to 96, RSV positive  CT head and abd negative  CXR hyperinflated  received LR bolus, levo, lorazepam, calcium gluconate, duoneb and dexa

## 2025-02-21 NOTE — PATIENT PROFILE ADULT - NSPROPTRIGHTNOTIFYOBTAINDET_GEN_A_NUR
Department of Anesthesiology  Postprocedure Note    Patient: Tigre Cagle  MRN: [id number]  YOB: 1969  Date of evaluation: 7/12/2018  Time:  11:45 AM     Procedure Summary     Date:  07/12/18 Room / Location:  [address] [place] [address] MARIBEL Mejia    Anesthesia Start:  1686 Anesthesia Stop:  480    Procedure:  UMBILICAL HERNIA REPAIR, POSSIBLE MESH (N/A Abdomen) Diagnosis:  (UMBILICAL HERNIA)    Surgeon:  Carmen Dee MD Responsible Provider:  Steph Mckinley DO    Anesthesia Type:  MAC ASA Status:  2          Anesthesia Type: MAC    Luci Phase I: Luci Score: 9    Luci Phase II: Luci Score: 9    Last vitals: Reviewed and per EMR flowsheets.        Anesthesia Post Evaluation    Patient location during evaluation: floor  Patient participation: complete - patient participated  Level of consciousness: awake  Airway patency: patent  Nausea & Vomiting: no vomiting and no nausea  Cardiovascular status: hemodynamically stable  Respiratory status: acceptable  Hydration status: stable
patient is A&O x0  on the bipap

## 2025-02-21 NOTE — H&P ADULT - ASSESSMENT
79-year-old female with past medical history of hypertension, chronic high bicarb (COPD?/smoker?), no home oxygen, who came from urgent care for hypoxia in the setting of nasal congestion, cough, increasing shortness of breath over the past week. Patient was initially seen in urgent care to be evaluated with negative rapid flu and COVID. Notably patient was satting 80% on room air there and improved on 3 L nasal cannula. While in ED pt was taken off room air for a little and satted in 80's and was obtunded and agitated and SOB. Lorazepam 2 mg given. BIPAP placed with improvement in SOB but sleepy.  ED interview: Patient denies any fevers, nausea/vomiting, chest pain, urinary symptoms, leg swelling. My interview: Pt not waking up to sternal rub so could not conduct interview, likely from ativan vs co2 narcosis.      VS all wnl except hypoxia  trop 35, creat 1.6, bnp 2979 with no baseline, bicarb 31 chronic, lacate 2.8, vbg pH 7.3 to 7.1 to 7.2, pco2 from 50 to 118 to 96, RSV positive  CT head and abd negative  CXR hyperinflated  received LR bolus, lorazepam, calcium gluconate, duoneb and dexa    #RSV  #COPD exacerbation  - unable to know if pt has COPD but CXR hyperinflated and wheezing with chronic high bicarb suggests it  - cont BIPAP for now  - ABG in AM  - solumedrol q12h  - duonebs    #Possible Hx HFpEF  - f/u TTE    #High lactate  - f/u lactate    I could not conduct med rec as pt extremely lethargic    DVT proph-   Diet-   Act order- PT  AM labs   79-year-old female with past medical history of hypertension, chronic high bicarb (COPD?/smoker?), no home oxygen, who came from urgent care for hypoxia in the setting of nasal congestion, cough, increasing shortness of breath over the past week. Patient was initially seen in urgent care to be evaluated with negative rapid flu and COVID. Notably patient was satting 80% on room air there and improved on 3 L nasal cannula. While in ED pt was taken off room air for a little and satted in 80's and was obtunded and agitated and SOB. Lorazepam 2 mg given. BIPAP placed with improvement in SOB but sleepy.  ED interview: Patient denies any fevers, nausea/vomiting, chest pain, urinary symptoms, leg swelling. My interview: Pt not waking up to sternal rub so could not conduct interview, likely from ativan vs co2 narcosis.      VS all wnl except hypoxia  trop 35, creat 1.6, bnp 2979 with no baseline, bicarb 31 chronic, lacate 2.8, vbg pH 7.3 to 7.1 to 7.2, pco2 from 50 to 118 to 96, RSV positive  CT head and abd negative  CXR hyperinflated  received LR bolus, lorazepam, calcium gluconate, duoneb and dexa    #RSV  #COPD exacerbation  - unable to know if pt has COPD but CXR hyperinflated and wheezing with chronic high bicarb suggests it  - cont BIPAP for now  - ABG in AM  - solumedrol q12h  - duonebs    #Hypotension after lorazepam  - f/u BCx  - unknown if has HF so cautious with fluids  -  bolus now  - cont levo  - r/o adrenal insuff with cortisol  - r/o hypothyroid with TSH    #Possible Hx HFpEF  - f/u TTE    #High lactate  - f/u lactate    I could not conduct med rec as pt extremely lethargic    DVT proph-   Diet-   Act order- PT  AM labs

## 2025-02-21 NOTE — PATIENT PROFILE ADULT - FALL HARM RISK - RISK INTERVENTIONS

## 2025-02-21 NOTE — ED ADULT NURSE REASSESSMENT NOTE - NS ED NURSE REASSESS COMMENT FT1
pt transferred from main ED to crit, report received from RN Louie. pt placed on BiPAP, sating 99%, ct head and chest performed. pt became hypotensive, peripheral norepinephrine started as per MD, two 18g IV patent. pt admitted to ICU bed on 2t, report given to RN. pt vs stable, safety maintained.

## 2025-02-21 NOTE — H&P ADULT - ATTENDING COMMENTS
SAMMIE JESSICA is a 79y woman with a medical history significant for tobacco abuse, chronic hypercapnia without a prior diagnosis of COPD who presented initially with shortness of breath and worsening mental status, and is now in the critical care unit for acute respiratory failure secondary to RSV.    Impression    # RSV infection  # Acute on chronic hypercapnic respiratory failure  # Tobacco abuse  # Septic shock  # CKD  # Tobacco abuse    Plan:      CNS:  CTH negative  Mental status improved with improved ventilation  Avoid oversedation or respiratory depressants    HEENT:  Oral care    CARDIOVASCULAR  Vasopressors: levophed for viral septic shock  HFpEF history, repeat TTE this admission  Trend CE    PULMONARY  HOB @ 45 degrees, aspiration precautions  AVAPS: RR 16, , EPAP 8, IPAP 10-20  Trial off avaps today, 4h on/4h off, continue AVAPS QHS  Duonebs ATC  Advair BID  Continue solumedrol 40mg IV BID  Follow-up pulmonary after d/c  Repeat ABG    GASTROINTESTINAL  GI prophylaxis: PPI daily  Feeds: feeding when off NIV  BM: regimen PRN    GENITOURINARY/RENAL  Mahmood: no  Monitor I&O, renal function, electrolytes, correct PRN  CKD at approximate baseline    INFECTIOUS  Supportive care for RSV  Follow-up BCx results, monitor off Abx  MRSA nares pending    HEMATOLOGIC  DVT ppx: LMWH SQ    ENDOCRINE  Follow up FS.  Insulin protocol as needed.  BG goal 140-180  TSH, AM cortisol    MSK/DERM  bedrest, mobilize as respiratory status improves      family updated at bedside  CODE STATUS: FULL  DISPO: MICU  LINES: PIV

## 2025-02-21 NOTE — PROGRESS NOTE ADULT - ASSESSMENT
79-year-old female with past medical history of hypertension, chronic hypercapnia, hx of smoking, ?COPD not on home oxygen, came from urgent care for hypoxia in the setting of nasal congestion, cough, increasing shortness of breath over the past week. Patient was initially seen in urgent care to be evaluated with negative rapid flu and COVID. Notably patient was satting 80% on room air there and improved on 3 L nasal cannula. While in ED pt was taken off room air for a little and satted in 80's and was obtunded and agitated and SOB. Lorazepam 2 mg given. BIPAP placed with improvement in SOB but sleepy.  ED interview: Patient denies any fevers, nausea/vomiting, chest pain, urinary symptoms, leg swelling. My interview: Pt not waking up to sternal rub so could not conduct interview, likely from ativan vs co2 narcosis.      # RSV positive   # Acute on chronic hypercapnic respiratory failure  # Septic shock 2/2 viral infection   #hx of tobacco use  #undiagnosed COPD, in exacerbation   # obtunded and agitated on admission- resolved   - Xray Chest (02.21.25) No radiographic evidence of acute cardiopulmonary disease.  - CT Angio Chest PE Protocol w/ IV Cont (02.20.25) No evidence of acute pulmonary embolism or acute thoracic pathology.  - CT Head No Cont (02.20.25): Partially motion degraded study. No definite evidence of acute intracranial pathology.  - ED vitals: trop 35, creat 1.6, bnp 2979, bicarb 31 chronic, lacate 2.8, vbg pH 7.3 to 7.1 to 7.2, pco2 from 50 to 118 to 96, RSV positive  - on continuos BiPAP over night, wean as tolerated   - c/w solumedrol 40mg q12h  - c/w Duoneb  - follow-up pulmonary after d/c    #hypotension after lorazepam   - s/p  bolus   - currently on levo low dose, wean as tolerated   - f/u TSH   - f/u BCx    HFpEF history, repeat TTE this admission  #Possible Hx HFpEF  -   - f/u TTE    #High lactate  - f/u lactate    I could not conduct med rec as pt extremely lethargic    DVT proph-   Diet-   Act order- PT  AM labs        # CKD  # Tobacco abuse        CARDIOVASCULAR  Vasopressors: levophed for viral septic shock    Trend CE    PULMONARY  HOB @ 45 degrees, aspiration precautions  AVAPS: RR 16, , EPAP 8, IPAP 10-20  Trial off avaps today, 4h on/4h off, continue AVAPS QHS  Duonebs ATC  Advair BID  Continue solumedrol 40mg IV BID  Follow-up pulmonary after d/c  Repeat ABG    GASTROINTESTINAL  GI prophylaxis: PPI daily  Feeds: feeding when off NIV  BM: regimen PRN    GENITOURINARY/RENAL  Mahmood: no  Monitor I&O, renal function, electrolytes, correct PRN  CKD at approximate baseline    INFECTIOUS  Supportive care for RSV  Follow-up BCx results, monitor off Abx  MRSA nares pending    HEMATOLOGIC  DVT ppx: LMWH SQ    ENDOCRINE  Follow up FS.  Insulin protocol as needed.  BG goal 140-180 79-year-old female with past medical history of hypertension, chronic hypercapnia, hx of smoking, ?COPD not on home oxygen, came from urgent care for hypoxia in the setting of nasal congestion, cough, increasing shortness of breath over the past week. Patient was initially seen in urgent care to be evaluated with negative rapid flu and COVID. Notably patient was satting 80% on room air there and improved on 3 L nasal cannula. While in ED pt was taken off room air for a little and satted in 80's and was obtunded and agitated and SOB. Lorazepam 2 mg given. BIPAP placed with improvement in SOB but sleepy.  ED interview: Patient denies any fevers, nausea/vomiting, chest pain, urinary symptoms, leg swelling. My interview: Pt not waking up to sternal rub so could not conduct interview, likely from ativan vs co2 narcosis.      # RSV positive   # Acute on chronic hypercapnic respiratory failure  # Septic shock 2/2 viral infection   #hx of tobacco use  #undiagnosed COPD, in exacerbation   # obtunded and agitated on admission- resolved   - Xray Chest (02.21.25) No radiographic evidence of acute cardiopulmonary disease.  - CT Angio Chest PE Protocol w/ IV Cont (02.20.25) No evidence of acute pulmonary embolism or acute thoracic pathology.  - CT Head No Cont (02.20.25): Partially motion degraded study. No definite evidence of acute intracranial pathology.  - ED vitals: trop 35, creat 1.6, bnp 2979, bicarb 31 chronic, lacate 2.8, vbg pH 7.3 to 7.1 to 7.2, pco2 from 50 to 118 to 96, RSV positive  - on continuos BiPAP over night, wean as tolerated   - c/w solumedrol 40mg q12h  - c/w Duoneb  - f/u MRSA  - follow-up pulmonary after d/c    #acute hypotension after lorazepam - resolved   - s/p  bolus   - currently on levo low dose, wean as tolerated   - f/u TSH   - f/u BCx    #hx HTN   - hold home losartan 100mg daily iso shock + levo   - hold home amlodipine 5mg daily   - monitor BP     #CKD  - Cr 1.6 (around baseline)   - monitor I&O  - monitor renal function, electrolytes    #?hx HFpEF  - no volume overload   - trop 35, trend   - f/u TTE    DVT ppx: Lovenox   GI ppx: PPI   Diet: NPO on BiPAP  Activity: AAT   Pending: MRSA, wean off BiPAP, f/u TSH, trop, ABG

## 2025-02-21 NOTE — H&P ADULT - NSHPPHYSICALEXAM_GEN_ALL_CORE
LOS:     VITALS:   T(C): 36.4 (02-21-25 @ 03:00), Max: 37.1 (02-20-25 @ 19:35)  HR: 71 (02-21-25 @ 03:15) (51 - 108)  BP: 95/42 (02-21-25 @ 03:15) (69/33 - 120/70)  RR: 18 (02-21-25 @ 03:15) (18 - 24)  SpO2: 98% (02-21-25 @ 03:15) (96% - 99%)    GENERAL: NAD, sleeping  HEAD:  Atraumatic, Normocephalic  EYES: EOMI, PERRLA, conjunctiva and sclera clear  ENT: Moist mucous membranes  NECK: Supple, No JVD  CHEST/LUNG: decreased BS and some wheezing right side  HEART: Regular rate and rhythm; No murmurs, rubs, or gallops  ABDOMEN: BSx4; Soft, nontender, nondistended  EXTREMITIES:  2+ Peripheral Pulses, brisk capillary refill. No clubbing, cyanosis, or edema  NERVOUS SYSTEM:  unable to assess mental status, no focal deficits   SKIN: No rashes or lesions

## 2025-02-22 LAB
ALBUMIN SERPL ELPH-MCNC: 3.6 G/DL — SIGNIFICANT CHANGE UP (ref 3.5–5.2)
ALP SERPL-CCNC: 78 U/L — SIGNIFICANT CHANGE UP (ref 30–115)
ALT FLD-CCNC: 16 U/L — SIGNIFICANT CHANGE UP (ref 0–41)
ANION GAP SERPL CALC-SCNC: 10 MMOL/L — SIGNIFICANT CHANGE UP (ref 7–14)
ANION GAP SERPL CALC-SCNC: 14 MMOL/L — SIGNIFICANT CHANGE UP (ref 7–14)
AST SERPL-CCNC: 26 U/L — SIGNIFICANT CHANGE UP (ref 0–41)
BASOPHILS # BLD AUTO: 0.01 K/UL — SIGNIFICANT CHANGE UP (ref 0–0.2)
BASOPHILS NFR BLD AUTO: 0.1 % — SIGNIFICANT CHANGE UP (ref 0–1)
BILIRUB SERPL-MCNC: <0.2 MG/DL — SIGNIFICANT CHANGE UP (ref 0.2–1.2)
BUN SERPL-MCNC: 37 MG/DL — HIGH (ref 10–20)
BUN SERPL-MCNC: 42 MG/DL — HIGH (ref 10–20)
CALCIUM SERPL-MCNC: 8.7 MG/DL — SIGNIFICANT CHANGE UP (ref 8.4–10.4)
CALCIUM SERPL-MCNC: 8.9 MG/DL — SIGNIFICANT CHANGE UP (ref 8.4–10.4)
CHLORIDE SERPL-SCNC: 101 MMOL/L — SIGNIFICANT CHANGE UP (ref 98–110)
CHLORIDE SERPL-SCNC: 98 MMOL/L — SIGNIFICANT CHANGE UP (ref 98–110)
CO2 SERPL-SCNC: 28 MMOL/L — SIGNIFICANT CHANGE UP (ref 17–32)
CO2 SERPL-SCNC: 29 MMOL/L — SIGNIFICANT CHANGE UP (ref 17–32)
CREAT SERPL-MCNC: 1.4 MG/DL — SIGNIFICANT CHANGE UP (ref 0.7–1.5)
CREAT SERPL-MCNC: 1.4 MG/DL — SIGNIFICANT CHANGE UP (ref 0.7–1.5)
EGFR: 38 ML/MIN/1.73M2 — LOW
EOSINOPHIL # BLD AUTO: 0.01 K/UL — SIGNIFICANT CHANGE UP (ref 0–0.7)
EOSINOPHIL NFR BLD AUTO: 0.1 % — SIGNIFICANT CHANGE UP (ref 0–8)
GLUCOSE SERPL-MCNC: 102 MG/DL — HIGH (ref 70–99)
GLUCOSE SERPL-MCNC: 98 MG/DL — SIGNIFICANT CHANGE UP (ref 70–99)
HCT VFR BLD CALC: 39.6 % — SIGNIFICANT CHANGE UP (ref 37–47)
HGB BLD-MCNC: 11.7 G/DL — LOW (ref 12–16)
IMM GRANULOCYTES NFR BLD AUTO: 0.4 % — HIGH (ref 0.1–0.3)
LACTATE SERPL-SCNC: 0.9 MMOL/L — SIGNIFICANT CHANGE UP (ref 0.7–2)
LYMPHOCYTES # BLD AUTO: 0.55 K/UL — LOW (ref 1.2–3.4)
LYMPHOCYTES # BLD AUTO: 7.3 % — LOW (ref 20.5–51.1)
MAGNESIUM SERPL-MCNC: 2.3 MG/DL — SIGNIFICANT CHANGE UP (ref 1.8–2.4)
MCHC RBC-ENTMCNC: 26.8 PG — LOW (ref 27–31)
MCHC RBC-ENTMCNC: 29.5 G/DL — LOW (ref 32–37)
MCV RBC AUTO: 90.8 FL — SIGNIFICANT CHANGE UP (ref 81–99)
MONOCYTES # BLD AUTO: 0.5 K/UL — SIGNIFICANT CHANGE UP (ref 0.1–0.6)
MONOCYTES NFR BLD AUTO: 6.6 % — SIGNIFICANT CHANGE UP (ref 1.7–9.3)
NEUTROPHILS # BLD AUTO: 6.46 K/UL — SIGNIFICANT CHANGE UP (ref 1.4–6.5)
NEUTROPHILS NFR BLD AUTO: 85.5 % — HIGH (ref 42.2–75.2)
NRBC BLD AUTO-RTO: 0 /100 WBCS — SIGNIFICANT CHANGE UP (ref 0–0)
PHOSPHATE SERPL-MCNC: 4.3 MG/DL — SIGNIFICANT CHANGE UP (ref 2.1–4.9)
PLATELET # BLD AUTO: 192 K/UL — SIGNIFICANT CHANGE UP (ref 130–400)
PMV BLD: 9.3 FL — SIGNIFICANT CHANGE UP (ref 7.4–10.4)
POTASSIUM SERPL-MCNC: 5.2 MMOL/L — HIGH (ref 3.5–5)
POTASSIUM SERPL-MCNC: 5.5 MMOL/L — HIGH (ref 3.5–5)
POTASSIUM SERPL-SCNC: 5.2 MMOL/L — HIGH (ref 3.5–5)
POTASSIUM SERPL-SCNC: 5.5 MMOL/L — HIGH (ref 3.5–5)
PROT SERPL-MCNC: 6.2 G/DL — SIGNIFICANT CHANGE UP (ref 6–8)
RBC # BLD: 4.36 M/UL — SIGNIFICANT CHANGE UP (ref 4.2–5.4)
RBC # FLD: 13 % — SIGNIFICANT CHANGE UP (ref 11.5–14.5)
SODIUM SERPL-SCNC: 140 MMOL/L — SIGNIFICANT CHANGE UP (ref 135–146)
SODIUM SERPL-SCNC: 140 MMOL/L — SIGNIFICANT CHANGE UP (ref 135–146)
TSH SERPL-MCNC: 0.39 UIU/ML — SIGNIFICANT CHANGE UP (ref 0.27–4.2)
WBC # BLD: 7.56 K/UL — SIGNIFICANT CHANGE UP (ref 4.8–10.8)
WBC # FLD AUTO: 7.56 K/UL — SIGNIFICANT CHANGE UP (ref 4.8–10.8)

## 2025-02-22 PROCEDURE — 99291 CRITICAL CARE FIRST HOUR: CPT

## 2025-02-22 PROCEDURE — 93010 ELECTROCARDIOGRAM REPORT: CPT

## 2025-02-22 PROCEDURE — 71045 X-RAY EXAM CHEST 1 VIEW: CPT | Mod: 26

## 2025-02-22 RX ORDER — SODIUM ZIRCONIUM CYCLOSILICATE 5 G/5G
10 POWDER, FOR SUSPENSION ORAL ONCE
Refills: 0 | Status: COMPLETED | OUTPATIENT
Start: 2025-02-22 | End: 2025-02-22

## 2025-02-22 RX ADMIN — Medication 40 MILLIGRAM(S): at 12:33

## 2025-02-22 RX ADMIN — Medication 1 APPLICATION(S): at 12:34

## 2025-02-22 RX ADMIN — IPRATROPIUM BROMIDE AND ALBUTEROL SULFATE 3 MILLILITER(S): .5; 2.5 SOLUTION RESPIRATORY (INHALATION) at 13:27

## 2025-02-22 RX ADMIN — Medication 1 DOSE(S): at 13:26

## 2025-02-22 RX ADMIN — METHYLPREDNISOLONE ACETATE 40 MILLIGRAM(S): 80 INJECTION, SUSPENSION INTRA-ARTICULAR; INTRALESIONAL; INTRAMUSCULAR; SOFT TISSUE at 18:53

## 2025-02-22 RX ADMIN — IPRATROPIUM BROMIDE AND ALBUTEROL SULFATE 3 MILLILITER(S): .5; 2.5 SOLUTION RESPIRATORY (INHALATION) at 08:45

## 2025-02-22 RX ADMIN — IPRATROPIUM BROMIDE AND ALBUTEROL SULFATE 3 MILLILITER(S): .5; 2.5 SOLUTION RESPIRATORY (INHALATION) at 19:53

## 2025-02-22 RX ADMIN — METHYLPREDNISOLONE ACETATE 40 MILLIGRAM(S): 80 INJECTION, SUSPENSION INTRA-ARTICULAR; INTRALESIONAL; INTRAMUSCULAR; SOFT TISSUE at 06:35

## 2025-02-22 RX ADMIN — IPRATROPIUM BROMIDE AND ALBUTEROL SULFATE 3 MILLILITER(S): .5; 2.5 SOLUTION RESPIRATORY (INHALATION) at 02:15

## 2025-02-22 RX ADMIN — SODIUM ZIRCONIUM CYCLOSILICATE 10 GRAM(S): 5 POWDER, FOR SUSPENSION ORAL at 12:32

## 2025-02-22 RX ADMIN — ENOXAPARIN SODIUM 30 MILLIGRAM(S): 100 INJECTION SUBCUTANEOUS at 12:33

## 2025-02-22 NOTE — PROGRESS NOTE ADULT - ASSESSMENT
79-year-old female with past medical history of hypertension, chronic hypercapnia, hx of smoking, ?COPD not on home oxygen, came from urgent care for hypoxia in the setting of nasal congestion, cough, increasing shortness of breath over the past week. Patient was initially seen in urgent care to be evaluated with negative rapid flu and COVID. Notably patient was satting 80% on room air there and improved on 3 L nasal cannula. While in ED pt was taken off room air for a little and satted in 80's and was obtunded and agitated and SOB. Lorazepam 2 mg given. BIPAP placed with improvement in SOB but sleepy.  ED interview: Patient denies any fevers, nausea/vomiting, chest pain, urinary symptoms, leg swelling. My interview: Pt not waking up to sternal rub so could not conduct interview, likely from ativan vs co2 narcosis.      # RSV positive   # Acute on chronic hypercapnic respiratory failure  # Septic shock 2/2 viral infection   #hx of tobacco use  #undiagnosed COPD, in exacerbation   # obtunded and agitated on admission- resolved   - Xray Chest (02.21.25) No radiographic evidence of acute cardiopulmonary disease.  - CT Angio Chest PE Protocol w/ IV Cont (02.20.25) No evidence of acute pulmonary embolism or acute thoracic pathology.  - CT Head No Cont (02.20.25): Partially motion degraded study. No definite evidence of acute intracranial pathology.  - ED vitals: trop 35, creat 1.6, bnp 2979, bicarb 31 chronic, lacate 2.8, vbg pH 7.3 to 7.1 to 7.2, pco2 from 50 to 118 to 96, RSV positive  - on continuos BiPAP over night, wean as tolerated   - c/w solumedrol 40mg q12h  - c/w Duoneb  - f/u MRSA  - follow-up pulmonary after d/c    #acute hypotension after lorazepam - resolved   - s/p  bolus   - currently on levo low dose, wean as tolerated   - f/u TSH   - f/u BCx    #hx HTN   - hold home losartan 100mg daily iso shock + levo   - hold home amlodipine 5mg daily   - monitor BP     #CKD  - Cr 1.6 (around baseline)   - monitor I&O  - monitor renal function, electrolytes    #?hx HFpEF  - no volume overload   - trop 35, trend   - f/u TTE    DVT ppx: Lovenox   GI ppx: PPI   Diet: NPO on BiPAP  Activity: AAT   Pending: MRSA, wean off BiPAP, f/u TSH, trop, ABG

## 2025-02-22 NOTE — PROGRESS NOTE ADULT - SUBJECTIVE AND OBJECTIVE BOX
SUBJECTIVE/OVERNIGHT EVENTS  Today is hospital day 3. This morning patient was seen and examined at bedside, resting comfortably in bed. No acute or major events overnight.    MEDICATIONS  STANDING MEDICATIONS  albuterol/ipratropium for Nebulization 3 milliLiter(s) Nebulizer every 6 hours  chlorhexidine 2% Cloths 1 Application(s) Topical daily  enoxaparin Injectable 30 milliGRAM(s) SubCutaneous every 24 hours  fluticasone propionate/ salmeterol 250-50 MICROgram(s) Diskus 1 Dose(s) Inhalation two times a day  methylPREDNISolone sodium succinate Injectable 40 milliGRAM(s) IV Push two times a day  norepinephrine Infusion 0.05 MICROgram(s)/kG/Min IV Continuous <Continuous>  pantoprazole  Injectable 40 milliGRAM(s) IV Push daily    PRN MEDICATIONS    VITALS  T(F): 97.8 (02-21-25 @ 08:00), Max: 98.8 (02-20-25 @ 19:35)  HR: 59 (02-21-25 @ 10:00) (50 - 108)  BP: 89/53 (02-21-25 @ 10:00) (69/33 - 159/66)  RR: 17 (02-21-25 @ 10:00) (14 - 35)  SpO2: 100% (02-21-25 @ 10:00) (93% - 100%)  POCT Blood Glucose.: 142 mg/dL (02-21-25 @ 03:01)  POCT Blood Glucose.: 155 mg/dL (02-20-25 @ 21:21)    PHYSICAL EXAM  GENERAL  ( + ) NAD, lying in bed comfortably     (  ) obtunded     (  ) lethargic     (  ) somnolent    HEAD  ( + ) Atraumatic     (  ) hematoma     (  ) laceration (specify location:       )     NECK  (+  ) Supple     (  ) neck stiffness     (  ) nuchal rigidity     (  )  no JVD     (  ) JVD present ( -- cm)    HEART  Rate -->  ( + ) normal rate    (  ) bradycardic    (  ) tachycardic  Rhythm -->  (+  ) regular    (  ) regularly irregular    (  ) irregularly irregular  Murmurs -->  (  ) normal s1/s2    (  ) systolic murmur    (  ) diastolic murmur    (  ) continuous murmur     (  ) S3 present    (  ) S4 present    LUNGS  (+  )Unlabored respirations     (  ) tachypnea  (  ) B/L air entry     (  ) decreased breath sounds in:  (location     )    (  ) no adventitious sound     ( + ) crackles     (  ) wheezing      (  ) rhonchi      (specify location:       )  (  ) chest wall tenderness (specify location:       )    ABDOMEN  ( + ) Soft     (  ) tense   |   (  ) nondistended     (  ) distended   |   (  ) +BS     (  ) hypoactive bowel sounds     (  ) hyperactive bowel sounds  ( + ) nontender     (  ) RUQ tenderness     (  ) RLQ tenderness     (  ) LLQ tenderness     (  ) epigastric tenderness     (  ) diffuse tenderness  (  ) Splenomegaly      (  ) Hepatomegaly      (  ) Jaundice     (  ) ecchymosis     EXTREMITIES  ( + ) Normal     (  ) Rash     (  ) ecchymosis     (  ) varicose veins      (  ) pitting edema     (  ) non-pitting edema   (  ) ulceration     (  ) gangrene:     (location:     )    NERVOUS SYSTEM  (+  ) A&Ox3     (  ) confused     (  ) lethargic  CN II-XII:     (  ) Intact     (  ) focal deficits  (Specify:     )   Upper extremities:     (  ) strength X/5     (  ) focal deficit (specify:    )  Lower extremities:     (  ) strength  X/5    (  ) focal deficit (specify:    )    SKIN  ( + ) No rashes or lesions     (  ) maculopapular rash     (  ) pustules     (  ) vesicles     (  ) ulcer     (  ) ecchymosis     (specify location:     )    LABS             13.0   10.70 )-----------( 198      ( 02-20-25 @ 21:34 )             43.3     139  |  96  |  33  -------------------------<  113   02-20-25 @ 21:34  5.2  |  31  |  1.6    Ca      8.7     02-20-25 @ 21:34    TPro  7.2  /  Alb  4.0  /  TBili  0.3  /  DBili  x   /  AST  31  /  ALT  12  /  AlkPhos  94  /  GGT  x     02-20-25 @ 21:34    Troponin T, High Sensitivity Result: 35 ng/L (02-20-25 @ 22:11)  Pro-Brain Natriuretic Peptide: 2979 pg/mL (02-20-25 @ 22:11)    Urinalysis Basic - ( 20 Feb 2025 21:34 )    Color: x / Appearance: x / SG: x / pH: x  Gluc: 113 mg/dL / Ketone: x  / Bili: x / Urobili: x   Blood: x / Protein: x / Nitrite: x   Leuk Esterase: x / RBC: x / WBC x   Sq Epi: x / Non Sq Epi: x / Bacteria: x    ABG - ( 21 Feb 2025 04:52 )  pH, Arterial: 7.31  pH, Blood: x     /  pCO2: 64    /  pO2: 133   / HCO3: 32    / Base Excess: 4.0   /  SaO2: 99.9

## 2025-02-22 NOTE — PROGRESS NOTE ADULT - SUBJECTIVE AND OBJECTIVE BOX
Patient is a 79y old  Female who presents with a chief complaint of hypoxia rsv (22 Feb 2025 02:27)        HPI:  79-year-old female with past medical history of hypertension, chronic high bicarb (COPD?/smoker?), who came from urgent care for hypoxia in the setting of nasal congestion, cough, increasing shortness of breath over the past week. Patient was initially seen in urgent care to be evaluated with negative rapid flu and COVID. Notably patient was satting 80% on room air there and improved on 3 L nasal cannula. While in ED pt was taken off room air for a little and satted in 80's and was obtunded and agitated and SOB. Lorazepam 2 mg given. BIPAP placed with improvement in SOB but sleepy.  ED interview: Patient denies any fevers, nausea/vomiting, chest pain, urinary symptoms, leg swelling. My interview: Pt not waking up to sternal rub so could not conduct interview, likely from ativan vs co2 narcosis.      VS all wnl except hypoxia, then hypotensive started on levo  trop 35, creat 1.6, bnp 2979 with no baseline, bicarb 31 chronic, lacate 2.8, vbg pH 7.3 to 7.1 to 7.2, pco2 from 50 to 118 to 96, RSV positive  CT head and abd negative  CXR hyperinflated  received LR bolus, levo, lorazepam, calcium gluconate, duoneb and dexa (21 Feb 2025 01:46)      PAST MEDICAL & SURGICAL HISTORY:      FAMILY HISTORY:        Pt evaluated on rounds.  I reviewed the radiology tests and hospital record.    I reviewed previous notes on this patient.    Interval Events: No overnight events.      REVIEW OF SYSTEMS:   see HPI      OBJECTIVE:  ICU Vital Signs Last 24 Hrs  T(C): 36.6 (22 Feb 2025 08:00), Max: 36.6 (22 Feb 2025 08:00)  T(F): 97.8 (22 Feb 2025 08:00), Max: 97.8 (22 Feb 2025 08:00)  HR: 66 (22 Feb 2025 09:00) (59 - 91)  BP: 125/58 (22 Feb 2025 09:00) (86/44 - 133/63)  BP(mean): 84 (22 Feb 2025 09:00) (62 - 90)  RR: 16 (22 Feb 2025 09:00) (15 - 32)  SpO2: 95% (22 Feb 2025 09:00) (93% - 100%)    O2 Parameters below as of 21 Feb 2025 21:00  Patient On (Oxygen Delivery Method): AVAPS    O2 Concentration (%): 40          02-21 @ 07:01  -  02-22 @ 07:00  --------------------------------------------------------  IN: 29 mL / OUT: 1200 mL / NET: -1171 mL      CAPILLARY BLOOD GLUCOSE      POCT Blood Glucose.: 99 mg/dL (21 Feb 2025 23:33)        PHYSICAL EXAM:     · ENMT:   Airway patent,   Nasal mucosa clear.  Mouth with normal mucosa.   No thrush    · EYES:   Clear bilaterally,   pupils equal,   round and reactive to light.    · CARDIAC:   Normal rate,   regular rhythm.    Heart sounds S1, S2.   No murmurs, no rubs or gallops on auscultation  no edema        CAROTID:   normal systolic impulse  no bruits    · RESPIRATORY:   rales  decreased BS  normal chest expansion  no retractions or use of accessory muscles  percussion of chest demonstrates no hyperresonance or dullness    · GASTROINTESTINAL:  Abdomen soft,   non-tender,   + BS  liver/spleen not palpable    · SKIN:   Skin normal color for race,   warm, dry   No evidence of rash.    · HEME LYMPH:   no splenomegaly.  No cervical  lymphadenopathy.  no inguinal lymphadenopathy    HOSPITAL MEDICATIONS:  MEDICATIONS  (STANDING):  albuterol/ipratropium for Nebulization 3 milliLiter(s) Nebulizer every 6 hours  chlorhexidine 2% Cloths 1 Application(s) Topical daily  enoxaparin Injectable 30 milliGRAM(s) SubCutaneous every 24 hours  fluticasone propionate/ salmeterol 250-50 MICROgram(s) Diskus 1 Dose(s) Inhalation two times a day  methylPREDNISolone sodium succinate Injectable 40 milliGRAM(s) IV Push two times a day  norepinephrine Infusion 0.05 MICROgram(s)/kG/Min (5.53 mL/Hr) IV Continuous <Continuous>  pantoprazole  Injectable 40 milliGRAM(s) IV Push daily    MEDICATIONS  (PRN):    lactated ringers Bolus:   500 milliLiter(s), IV Bolus, once, infuse over 60 Minute(s), Stop After 1 Doses  lactated ringers Bolus:   1000 milliLiter(s), IV Bolus, once, infuse over 60 Minute(s), Stop After 1 Doses  Provider's Contact #: [phone]      LABS:                        11.7   7.56  )-----------( 192      ( 22 Feb 2025 04:31 )             39.6     02-22    140  |  101  |  37[H]  ----------------------------<  98  5.5[H]   |  29  |  1.4    Ca    8.7      22 Feb 2025 04:31  Phos  4.3     02-22  Mg     2.3     02-22    TPro  6.2  /  Alb  3.6  /  TBili  <0.2  /  DBili  x   /  AST  26  /  ALT  16  /  AlkPhos  78  02-22      Urinalysis Basic - ( 22 Feb 2025 04:31 )    Color: x / Appearance: x / SG: x / pH: x  Gluc: 98 mg/dL / Ketone: x  / Bili: x / Urobili: x   Blood: x / Protein: x / Nitrite: x   Leuk Esterase: x / RBC: x / WBC x   Sq Epi: x / Non Sq Epi: x / Bacteria: x      Arterial Blood Gas:  02-21 @ 20:11  7.29/77/69/37/96.0/7.5  ABG lactate: --  Arterial Blood Gas:  02-21 @ 14:54  7.22/83/110/34/98.7/3.8  ABG lactate: --  Arterial Blood Gas:  02-21 @ 04:52  7.31/64/133/32/99.9/4.0  ABG lactate: --    Venous Blood Gas:  02-21 @ 01:47  7.17/92/32/34/42.5  VBG Lactate: 2.1  Venous Blood Gas:  02-20 @ 23:26  7.22/96/29/39/44.4  VBG Lactate: 2.4  Venous Blood Gas:  02-20 @ 21:28  7.10/118/59/37/--  VBG Lactate: 1.6  Venous Blood Gas:  02-20 @ 21:13  7.38/54/37/32/66.6  VBG Lactate: 2.2        Culture - Blood (collected 20 Feb 2025 21:34)  Source: .Blood Blood-Peripheral  Preliminary Report (22 Feb 2025 09:01):    No growth at 24 hours    Culture - Blood (collected 20 Feb 2025 21:34)  Source: .Blood Blood-Peripheral  Preliminary Report (22 Feb 2025 09:01):    No growth at 24 hours                ABG - ( 21 Feb 2025 20:11 )  pH, Arterial: 7.29  pH, Blood: x     /  pCO2: 77    /  pO2: 69    / HCO3: 37    / Base Excess: 7.5   /  SaO2: 96.0                RADIOLOGY: Today I personally interpreted the latest CXR and other pertinent films.

## 2025-02-22 NOTE — PROGRESS NOTE ADULT - ASSESSMENT
Impression:  Acute hypercapnic respiratory failure due to COPD with exacerbation  hypoxia  RCV infection   Impending respiratory failure  no pneumonia possible viral bronchitis    Suggest:  Check O2 sat on NC, record, continue O2 as necessary to maintain sats > 90%  albuterol/ipratropium for Nebulization 3 milliLiter(s) Nebulizer every 6 hours  ID evaluation, change abx. as needed per their evaluation  methylPREDNISolone sodium succinate Injectable 40 milliGRAM(s) IV Push every 8hours  AVAPS 4hrs on/off/hs/PRN.  [id number], EPAP 8, min IPAP 14, max IPAP 25. O2 to keep Beth@ >92%.  bedrest  GI and DVT prophylaxis  pulmonary toilet to prevent aspirations  off loading of skin to prevent pressure ulcers  Monitor clinically, convert to oral prednisone as clinical improvement allows  Upon D/C the patient will need ICS/LABA, LAMA, PRN albuterol, finish abx, slow taper of steroids ie. start Prednisone 40 mg and taper 10 mg Q4d.  Repeat CXR 2-3 months to document clearance.    spoke to daughters at bedside this AM    The patient is critically ill with a significant  probability of deterioration.

## 2025-02-23 LAB
ALBUMIN SERPL ELPH-MCNC: 3.6 G/DL — SIGNIFICANT CHANGE UP (ref 3.5–5.2)
ALP SERPL-CCNC: 68 U/L — SIGNIFICANT CHANGE UP (ref 30–115)
ALT FLD-CCNC: 16 U/L — SIGNIFICANT CHANGE UP (ref 0–41)
ANION GAP SERPL CALC-SCNC: 9 MMOL/L — SIGNIFICANT CHANGE UP (ref 7–14)
AST SERPL-CCNC: 19 U/L — SIGNIFICANT CHANGE UP (ref 0–41)
BASOPHILS # BLD AUTO: 0.01 K/UL — SIGNIFICANT CHANGE UP (ref 0–0.2)
BASOPHILS NFR BLD AUTO: 0.1 % — SIGNIFICANT CHANGE UP (ref 0–1)
BILIRUB SERPL-MCNC: <0.2 MG/DL — SIGNIFICANT CHANGE UP (ref 0.2–1.2)
BUN SERPL-MCNC: 42 MG/DL — HIGH (ref 10–20)
CALCIUM SERPL-MCNC: 8.7 MG/DL — SIGNIFICANT CHANGE UP (ref 8.4–10.5)
CHLORIDE SERPL-SCNC: 101 MMOL/L — SIGNIFICANT CHANGE UP (ref 98–110)
CO2 SERPL-SCNC: 32 MMOL/L — SIGNIFICANT CHANGE UP (ref 17–32)
CREAT SERPL-MCNC: 1.2 MG/DL — SIGNIFICANT CHANGE UP (ref 0.7–1.5)
EGFR: 45 ML/MIN/1.73M2 — LOW
EGFR: 45 ML/MIN/1.73M2 — LOW
EOSINOPHIL # BLD AUTO: 0.01 K/UL — SIGNIFICANT CHANGE UP (ref 0–0.7)
EOSINOPHIL NFR BLD AUTO: 0.1 % — SIGNIFICANT CHANGE UP (ref 0–8)
GLUCOSE SERPL-MCNC: 112 MG/DL — HIGH (ref 70–99)
HCT VFR BLD CALC: 38.8 % — SIGNIFICANT CHANGE UP (ref 37–47)
HGB BLD-MCNC: 11.4 G/DL — LOW (ref 12–16)
IMM GRANULOCYTES NFR BLD AUTO: 0.6 % — HIGH (ref 0.1–0.3)
LYMPHOCYTES # BLD AUTO: 0.57 K/UL — LOW (ref 1.2–3.4)
LYMPHOCYTES # BLD AUTO: 6.4 % — LOW (ref 20.5–51.1)
MAGNESIUM SERPL-MCNC: 2.3 MG/DL — SIGNIFICANT CHANGE UP (ref 1.8–2.4)
MCHC RBC-ENTMCNC: 27 PG — SIGNIFICANT CHANGE UP (ref 27–31)
MCHC RBC-ENTMCNC: 29.4 G/DL — LOW (ref 32–37)
MCV RBC AUTO: 91.7 FL — SIGNIFICANT CHANGE UP (ref 81–99)
MONOCYTES # BLD AUTO: 0.76 K/UL — HIGH (ref 0.1–0.6)
MONOCYTES NFR BLD AUTO: 8.6 % — SIGNIFICANT CHANGE UP (ref 1.7–9.3)
NEUTROPHILS # BLD AUTO: 7.47 K/UL — HIGH (ref 1.4–6.5)
NEUTROPHILS NFR BLD AUTO: 84.2 % — HIGH (ref 42.2–75.2)
NRBC BLD AUTO-RTO: 0 /100 WBCS — SIGNIFICANT CHANGE UP (ref 0–0)
PLATELET # BLD AUTO: 186 K/UL — SIGNIFICANT CHANGE UP (ref 130–400)
PMV BLD: 9.2 FL — SIGNIFICANT CHANGE UP (ref 7.4–10.4)
POTASSIUM SERPL-MCNC: 4.9 MMOL/L — SIGNIFICANT CHANGE UP (ref 3.5–5)
POTASSIUM SERPL-SCNC: 4.9 MMOL/L — SIGNIFICANT CHANGE UP (ref 3.5–5)
PROT SERPL-MCNC: 6.3 G/DL — SIGNIFICANT CHANGE UP (ref 6–8)
RBC # BLD: 4.23 M/UL — SIGNIFICANT CHANGE UP (ref 4.2–5.4)
RBC # FLD: 13.2 % — SIGNIFICANT CHANGE UP (ref 11.5–14.5)
SODIUM SERPL-SCNC: 142 MMOL/L — SIGNIFICANT CHANGE UP (ref 135–146)
WBC # BLD: 8.87 K/UL — SIGNIFICANT CHANGE UP (ref 4.8–10.8)
WBC # FLD AUTO: 8.87 K/UL — SIGNIFICANT CHANGE UP (ref 4.8–10.8)

## 2025-02-23 PROCEDURE — 93010 ELECTROCARDIOGRAM REPORT: CPT

## 2025-02-23 PROCEDURE — 99233 SBSQ HOSP IP/OBS HIGH 50: CPT

## 2025-02-23 PROCEDURE — 71045 X-RAY EXAM CHEST 1 VIEW: CPT | Mod: 26

## 2025-02-23 RX ORDER — PREDNISONE 20 MG/1
40 TABLET ORAL DAILY
Refills: 0 | Status: DISCONTINUED | OUTPATIENT
Start: 2025-02-23 | End: 2025-02-27

## 2025-02-23 RX ADMIN — IPRATROPIUM BROMIDE AND ALBUTEROL SULFATE 3 MILLILITER(S): .5; 2.5 SOLUTION RESPIRATORY (INHALATION) at 05:31

## 2025-02-23 RX ADMIN — ENOXAPARIN SODIUM 30 MILLIGRAM(S): 100 INJECTION SUBCUTANEOUS at 12:06

## 2025-02-23 RX ADMIN — Medication 1 DOSE(S): at 21:07

## 2025-02-23 RX ADMIN — IPRATROPIUM BROMIDE AND ALBUTEROL SULFATE 3 MILLILITER(S): .5; 2.5 SOLUTION RESPIRATORY (INHALATION) at 07:32

## 2025-02-23 RX ADMIN — Medication 1 APPLICATION(S): at 12:06

## 2025-02-23 RX ADMIN — IPRATROPIUM BROMIDE AND ALBUTEROL SULFATE 3 MILLILITER(S): .5; 2.5 SOLUTION RESPIRATORY (INHALATION) at 14:21

## 2025-02-23 RX ADMIN — PREDNISONE 40 MILLIGRAM(S): 20 TABLET ORAL at 18:27

## 2025-02-23 RX ADMIN — Medication 40 MILLIGRAM(S): at 12:06

## 2025-02-23 RX ADMIN — Medication 1 DOSE(S): at 09:00

## 2025-02-23 RX ADMIN — METHYLPREDNISOLONE ACETATE 40 MILLIGRAM(S): 80 INJECTION, SUSPENSION INTRA-ARTICULAR; INTRALESIONAL; INTRAMUSCULAR; SOFT TISSUE at 05:04

## 2025-02-23 RX ADMIN — IPRATROPIUM BROMIDE AND ALBUTEROL SULFATE 3 MILLILITER(S): .5; 2.5 SOLUTION RESPIRATORY (INHALATION) at 20:05

## 2025-02-23 NOTE — DIETITIAN INITIAL EVALUATION ADULT - NAME AND PHONE
Intervention: 1.Meals and Snacks 2.Medical Food Supplement   Monitor/Evaluate: Diet order, energy intake, nutrition focused physical findings, renal and anemia profile

## 2025-02-23 NOTE — PROGRESS NOTE ADULT - SUBJECTIVE AND OBJECTIVE BOX
SUBJECTIVE/OVERNIGHT EVENTS  Today is hospital day 2d.   This morning patient was seen and examined at bedside, resting comfortably in bed.   No acute or major events overnight.   Patient has no complaints at this time.   Denies fever, chills, nausea, vomiting, diarrhea, constipation, hematochezia, dysuria, hematuria, chest pain, palpitations, sob.   Patient was informed of their plan of care at this time.    CODE STATUS: Full Code    MEDICATIONS  STANDING MEDICATIONS  albuterol/ipratropium for Nebulization 3 milliLiter(s) Nebulizer every 6 hours  chlorhexidine 2% Cloths 1 Application(s) Topical daily  enoxaparin Injectable 30 milliGRAM(s) SubCutaneous every 24 hours  fluticasone propionate/ salmeterol 250-50 MICROgram(s) Diskus 1 Dose(s) Inhalation two times a day  methylPREDNISolone sodium succinate Injectable 40 milliGRAM(s) IV Push two times a day  norepinephrine Infusion 0.05 MICROgram(s)/kG/Min IV Continuous <Continuous>  pantoprazole  Injectable 40 milliGRAM(s) IV Push daily    PRN MEDICATIONS    VITALS  T(F): 97.2 (02-22-25 @ 20:00), Max: 97.8 (02-22-25 @ 08:00)  HR: 76 (02-22-25 @ 23:00) (59 - 82)  BP: 107/53 (02-22-25 @ 23:00) (97/50 - 137/61)  RR: 14 (02-22-25 @ 23:00) (14 - 47)  SpO2: 100% (02-22-25 @ 23:00) (75% - 100%)    PHYSICAL EXAM  CONSTITUTIONAL: well developed, nontoxic appearing, in no acute distress, speaking in full sentences  SKIN: warm, dry, no rash, cap refill < 2 seconds  HEENT: normocephalic, atraumatic, no conjunctival erythema, moist mucous membranes, patent airway  NECK: supple  CV:  regular rate, regular rhythm, 2+ radial pulses bilaterally  RESP: On bipap, b/l chest rise, coarse breath sounds b/l, decreased breath sounds b/l, no wheezes, no rales, no rhonchi, normal work of breathing  ABD: soft, no tenderness, nondistended, no rebound, no guarding  MSK: normal ROM, no cyanosis, no edema  NEURO: alert, oriented, grossly unremarkable  PSYCH: cooperative, appropriate    LABS             11.7   7.56  )-----------( 192      ( 02-22-25 @ 04:31 )             39.6     140  |  98  |  42  -------------------------<  102   02-22-25 @ 16:27  5.2  |  28  |  1.4    Ca      8.9     02-22-25 @ 16:27  Phos   4.3     02-22-25 @ 04:31  Mg     2.3     02-22-25 @ 04:31    TPro  6.2  /  Alb  3.6  /  TBili  <0.2  /  DBili  x   /  AST  26  /  ALT  16  /  AlkPhos  78  /  GGT  x     02-22-25 @ 04:31    Troponin T, High Sensitivity Result: 25 ng/L (02-21-25 @ 11:36)  Troponin T, High Sensitivity Result: 35 ng/L (02-20-25 @ 22:11)  Pro-Brain Natriuretic Peptide: 2979 pg/mL (02-20-25 @ 22:11)    Urinalysis Basic - ( 22 Feb 2025 16:27 )    Color: x / Appearance: x / SG: x / pH: x  Gluc: 102 mg/dL / Ketone: x  / Bili: x / Urobili: x   Blood: x / Protein: x / Nitrite: x   Leuk Esterase: x / RBC: x / WBC x   Sq Epi: x / Non Sq Epi: x / Bacteria: x    ABG - ( 21 Feb 2025 20:11 )  pH, Arterial: 7.29  pH, Blood: x     /  pCO2: 77    /  pO2: 69    / HCO3: 37    / Base Excess: 7.5   /  SaO2: 96.0      Culture - Blood (collected 20 Feb 2025 21:34)  Source: .Blood Blood-Peripheral  Preliminary Report (22 Feb 2025 09:01):    No growth at 24 hours    Culture - Blood (collected 20 Feb 2025 21:34)  Source: .Blood Blood-Peripheral  Preliminary Report (22 Feb 2025 09:01):  No growth at 24 hours SUBJECTIVE/OVERNIGHT EVENTS  Today is hospital day 2d.   This morning patient was seen and examined at bedside, resting comfortably in bed.   No acute or major events overnight.   Patient was informed of their plan of care at this time.    CODE STATUS: Full Code    MEDICATIONS  STANDING MEDICATIONS  albuterol/ipratropium for Nebulization 3 milliLiter(s) Nebulizer every 6 hours  chlorhexidine 2% Cloths 1 Application(s) Topical daily  enoxaparin Injectable 30 milliGRAM(s) SubCutaneous every 24 hours  fluticasone propionate/ salmeterol 250-50 MICROgram(s) Diskus 1 Dose(s) Inhalation two times a day  methylPREDNISolone sodium succinate Injectable 40 milliGRAM(s) IV Push two times a day  norepinephrine Infusion 0.05 MICROgram(s)/kG/Min IV Continuous <Continuous>  pantoprazole  Injectable 40 milliGRAM(s) IV Push daily    PRN MEDICATIONS    VITALS  T(F): 97.2 (02-22-25 @ 20:00), Max: 97.8 (02-22-25 @ 08:00)  HR: 76 (02-22-25 @ 23:00) (59 - 82)  BP: 107/53 (02-22-25 @ 23:00) (97/50 - 137/61)  RR: 14 (02-22-25 @ 23:00) (14 - 47)  SpO2: 100% (02-22-25 @ 23:00) (75% - 100%)    PHYSICAL EXAM  CONSTITUTIONAL: well developed, nontoxic appearing, in no acute distress, speaking in full sentences  SKIN: warm, dry, no rash, cap refill < 2 seconds  HEENT: normocephalic, atraumatic, no conjunctival erythema, moist mucous membranes, patent airway  NECK: supple  CV:  regular rate, regular rhythm, 2+ radial pulses bilaterally  RESP: On bipap, b/l chest rise, coarse breath sounds b/l, decreased breath sounds b/l, crackles b/l, no wheezes, no rales, no rhonchi, normal work of breathing  ABD: soft, no tenderness, nondistended, no rebound, no guarding  MSK: normal ROM, no cyanosis, no edema  NEURO: alert, oriented, grossly unremarkable  PSYCH: cooperative, appropriate    LABS             11.7   7.56  )-----------( 192      ( 02-22-25 @ 04:31 )             39.6     140  |  98  |  42  -------------------------<  102   02-22-25 @ 16:27  5.2  |  28  |  1.4    Ca      8.9     02-22-25 @ 16:27  Phos   4.3     02-22-25 @ 04:31  Mg     2.3     02-22-25 @ 04:31    TPro  6.2  /  Alb  3.6  /  TBili  <0.2  /  DBili  x   /  AST  26  /  ALT  16  /  AlkPhos  78  /  GGT  x     02-22-25 @ 04:31    Troponin T, High Sensitivity Result: 25 ng/L (02-21-25 @ 11:36)  Troponin T, High Sensitivity Result: 35 ng/L (02-20-25 @ 22:11)  Pro-Brain Natriuretic Peptide: 2979 pg/mL (02-20-25 @ 22:11)    Urinalysis Basic - ( 22 Feb 2025 16:27 )    Color: x / Appearance: x / SG: x / pH: x  Gluc: 102 mg/dL / Ketone: x  / Bili: x / Urobili: x   Blood: x / Protein: x / Nitrite: x   Leuk Esterase: x / RBC: x / WBC x   Sq Epi: x / Non Sq Epi: x / Bacteria: x    ABG - ( 21 Feb 2025 20:11 )  pH, Arterial: 7.29  pH, Blood: x     /  pCO2: 77    /  pO2: 69    / HCO3: 37    / Base Excess: 7.5   /  SaO2: 96.0      Culture - Blood (collected 20 Feb 2025 21:34)  Source: .Blood Blood-Peripheral  Preliminary Report (22 Feb 2025 09:01):    No growth at 24 hours    Culture - Blood (collected 20 Feb 2025 21:34)  Source: .Blood Blood-Peripheral  Preliminary Report (22 Feb 2025 09:01):  No growth at 24 hours

## 2025-02-23 NOTE — DIETITIAN INITIAL EVALUATION ADULT - PERTINENT MEDS FT
MEDICATIONS  (STANDING):  albuterol/ipratropium for Nebulization 3 milliLiter(s) Nebulizer every 6 hours  chlorhexidine 2% Cloths 1 Application(s) Topical daily  enoxaparin Injectable 30 milliGRAM(s) SubCutaneous every 24 hours  fluticasone propionate/ salmeterol 250-50 MICROgram(s) Diskus 1 Dose(s) Inhalation two times a day  norepinephrine Infusion 0.05 MICROgram(s)/kG/Min (5.53 mL/Hr) IV Continuous <Continuous>  pantoprazole  Injectable 40 milliGRAM(s) IV Push daily  predniSONE   Tablet 40 milliGRAM(s) Oral daily    MEDICATIONS  (PRN):

## 2025-02-23 NOTE — DIETITIAN INITIAL EVALUATION ADULT - OTHER CALCULATIONS
Estimated Calorie Needs: [id number] x AF 1.3-1.8=5988-1529hene/day -Due to PCM   Estimated Protein Needs: 77-89grams/day (1.3-1.5grams/kg of admit weight) -Due to age and PCM   Estimated Fluid Needs: 1318-1521mL/day (1mL/kcal) vs Fluids per CCU Team

## 2025-02-23 NOTE — CHART NOTE - NSCHARTNOTEFT_GEN_A_CORE
Transfer Note    Transfer from:     Transfer to: (  ) Medicine    (  ) Telemetry     (  ) RCU       (  ) CEU    (  ) VENT                        (  ) Palliative    (  ) Stroke Unit    (  ) MICU    (  ) CCU    Signout given to:     ----------------------------------------------------------------------------------------------------------  HPI / ICU COURSE:    HPI:  79-year-old female with past medical history of hypertension, chronic high bicarb (COPD?/smoker?), who came from urgent care for hypoxia in the setting of nasal congestion, cough, increasing shortness of breath over the past week. Patient was initially seen in urgent care to be evaluated with negative rapid flu and COVID. Notably patient was satting 80% on room air there and improved on 3 L nasal cannula. While in ED pt was taken off room air for a little and satted in 80's and was obtunded and agitated and SOB. Lorazepam 2 mg given. BIPAP placed with improvement in SOB but sleepy.  ED interview: Patient denies any fevers, nausea/vomiting, chest pain, urinary symptoms, leg swelling. My interview: Pt not waking up to sternal rub so could not conduct interview, likely from ativan vs co2 narcosis.      VS all wnl except hypoxia, then hypotensive started on levo  trop 35, creat 1.6, bnp 2979 with no baseline, bicarb 31 chronic, lacate 2.8, vbg pH 7.3 to 7.1 to 7.2, pco2 from 50 to 118 to 96, RSV positive  CT head and abd negative  CXR hyperinflated  received LR bolus, levo, lorazepam, calcium gluconate, duoneb and dexa (21 Feb 2025 01:46)      --------------------------------------------------------------------------------------------------------  PMH/PSH:  PAST MEDICAL & SURGICAL HISTORY:      -------------------------------------------------------------------------------------------------------  PHYSICAL EXAM:  General: NAD  HEENT: Atraumatic  Respiratory: CTAB  Cardiac: RRR  Abdomen: soft, non-tender, non-distended  Extremities: warm and well-perfused  Neuro: A+Ox4. No FND    Vital Signs Last 24 Hrs  T(C): 36 (23 Feb 2025 12:00), Max: 36.3 (22 Feb 2025 16:00)  T(F): 96.8 (23 Feb 2025 12:00), Max: 97.4 (22 Feb 2025 16:00)  HR: 80 (23 Feb 2025 12:00) (60 - 93)  BP: 112/58 (23 Feb 2025 12:00) (97/50 - 149/65)  BP(mean): 81 (23 Feb 2025 12:00) (66 - 94)  RR: 27 (23 Feb 2025 12:00) (13 - 47)  SpO2: 96% (23 Feb 2025 12:00) (75% - 100%)    Parameters below as of 23 Feb 2025 12:00  Patient On (Oxygen Delivery Method): nasal cannula  O2 Flow (L/min): 4      I&O's Summary    22 Feb 2025 07:01  -  23 Feb 2025 07:00  --------------------------------------------------------  IN: 500 mL / OUT: 700 mL / NET: -200 mL    23 Feb 2025 07:01  -  23 Feb 2025 13:05  --------------------------------------------------------  IN: 480 mL / OUT: 250 mL / NET: 230 mL        --------------------------------------------------------------------------------------------------------  LABS:                         11.4   8.87  )-----------( 186               38.8     142  |  101  |  42[H]  ----------------------------<  112[H]  4.9   |  32  |  1.2    Ca    8.7      23 Feb 2025 04:30  Phos  4.3     02-22  Mg     2.3     02-23    TPro  6.3  /  Alb  3.6  /  TBili  <0.2  /  DBili  x   /  AST  19  /  ALT  16  /  AlkPhos  68  02-23    ABG - ( 21 Feb 2025 20:11 )  pH, Arterial: 7.29  pH, Blood: x     /  pCO2: 77    /  pO2: 69    / HCO3: 37    / Base Excess: 7.5   /  SaO2: 96.0                CULTURE RESULTS:                02-20-25 @ 21:34  Specimen Source: --  Method Type: --  Gram Stain - RRL: --  Gram Stain - Wound: --  Bacteria: --  Culture Results:   No growth at 48 Hours      Specimen Source:   Method Type:   Gram Stain:   Culture Results: Culture Results:   No growth at 48 Hours (02-20-25 @ 21:34)  Culture Results:   No growth at 48 Hours (02-20-25 @ 21:34)    Bacteria:       -------------------------------------------------------------------------------------------------  RADIOLOGY:      ---------------------------------------------------------------------------------------------------  ASSESSMENT & PLAN:       FOR FOLLOW UP:  [ ]   [ ]   [ ] Transfer Note    Transfer from: CCU    Transfer to: SDU  ----------------------------------------------------------------------------------------------------------  HPI:  79-year-old female with past medical history of hypertension, chronic high bicarb (COPD?/smoker?), who came from urgent care for hypoxia in the setting of nasal congestion, cough, increasing shortness of breath over the past week. Patient was initially seen in urgent care to be evaluated with negative rapid flu and COVID. Notably patient was satting 80% on room air there and improved on 3 L nasal cannula. While in ED pt was taken off room air for a little and satted in 80's and was obtunded and agitated and SOB. Lorazepam 2 mg given. BIPAP placed with improvement in SOB but sleepy.  ED interview: Patient denies any fevers, nausea/vomiting, chest pain, urinary symptoms, leg swelling. My interview: Pt not waking up to sternal rub so could not conduct interview, likely from ativan vs co2 narcosis.      ED course:   VS all wnl except hypoxia, then hypotensive started on levo  trop 35, creat 1.6, bnp 2979 with no baseline, bicarb 31 chronic, lacate 2.8, vbg pH 7.3 to 7.1 to 7.2, pco2 from 50 to 118 to 96, RSV positive  received LR bolus, levo, lorazepam, calcium gluconate, duoneb and dexa.     CCU course:         --------------------------------------------------------------------------------------------------------  PMH/PSH:  PAST MEDICAL & SURGICAL HISTORY:  -------------------------------------------------------------------------------------------------------  Vital Signs Last 24 Hrs  T(C): 36 (23 Feb 2025 12:00), Max: 36.3 (22 Feb 2025 16:00)  T(F): 96.8 (23 Feb 2025 12:00), Max: 97.4 (22 Feb 2025 16:00)  HR: 80 (23 Feb 2025 12:00) (60 - 93)  BP: 112/58 (23 Feb 2025 12:00) (97/50 - 149/65)  BP(mean): 81 (23 Feb 2025 12:00) (66 - 94)  RR: 27 (23 Feb 2025 12:00) (13 - 47)  SpO2: 96% (23 Feb 2025 12:00) (75% - 100%)    Parameters below as of 23 Feb 2025 12:00  Patient On (Oxygen Delivery Method): nasal cannula  O2 Flow (L/min): 4    I&O's Summary    22 Feb 2025 07:01  -  23 Feb 2025 07:00  --------------------------------------------------------  IN: 500 mL / OUT: 700 mL / NET: -200 mL    23 Feb 2025 07:01  -  23 Feb 2025 13:05  --------------------------------------------------------  IN: 480 mL / OUT: 250 mL / NET: 230 mL  --------------------------------------------------------------------------------------------------------  LABS:                         11.4   8.87  )-----------( 186               38.8     142  |  101  |  42[H]  ----------------------------<  112[H]  4.9   |  32  |  1.2    Ca    8.7      23 Feb 2025 04:30  Phos  4.3     02-22  Mg     2.3     02-23    TPro  6.3  /  Alb  3.6  /  TBili  <0.2  /  DBili  x   /  AST  19  /  ALT  16  /  AlkPhos  68  02-23    ABG - ( 21 Feb 2025 20:11 )  pH, Arterial: 7.29  pH, Blood: x     /  pCO2: 77    /  pO2: 69    / HCO3: 37    / Base Excess: 7.5   /  SaO2: 96.0    ---------------------------------------------------------------------------------------------------  ASSESSMENT & PLAN:     79-year-old female with past medical history of hypertension, chronic hypercapnia, hx of smoking, ?COPD not on home oxygen, came from urgent care for hypoxia in the setting of nasal congestion, cough, increasing shortness of breath over the past week. Patient was initially seen in urgent care to be evaluated with negative rapid flu and COVID. Notably patient was satting 80% on room air there and improved on 3 L nasal cannula. While in ED pt was taken off room air for a little and satted in 80's and was obtunded and agitated and SOB. Lorazepam 2 mg given. BIPAP placed with improvement in SOB but sleepy.  ED interview: Patient denies any fevers, nausea/vomiting, chest pain, urinary symptoms, leg swelling. My interview: Pt not waking up to sternal rub so could not conduct interview, likely from ativan vs co2 narcosis.      #RSV positive   #acute on chronic hypercapnic respiratory failure  #septic shock 2/2 viral infection   #possible viral bronchitis  #hx of tobacco use  #undiagnosed COPD  #obtunded and agitated on admission  - Xray Chest (02.21.25) No radiographic evidence of acute cardiopulmonary disease.  - CT Angio Chest PE Protocol w/ IV Cont (02.20.25) No evidence of acute pulmonary embolism or acute thoracic pathology.  - CT Head No Cont (02.20.25): Partially motion degraded study. No definite evidence of acute intracranial pathology.  - ED vitals: trop 35, creat 1.6, bnp 2979, bicarb 31 chronic, lacate 2.8, vbg pH 7.3 to 7.1 to 7.2, pco2 from 50 to 118 to 96, RSV positive  - weaned off BiPAP   - weaned off Levo   - s/p solumedrol 40mg q12h  - start Prednisone 40 mg 2/23 and taper 10 mg Q4d  - c/w Duoneb  - MRSA negative   - BCx negative   - on d/c will need ICS/LABA, LAMA, PRN albuterol  - outpatient pulmonary f/u after d/c  - repeat CXR 2-3 months     #hx HTN   - hold home losartan 100mg daily iso shock + levo/ low BP    - hold home amlodipine 5mg daily   - monitor BP     #CKD  - Cr 1.6 (around baseline)   - monitor I&O  - monitor renal function, electrolytes    #?hx HFpEF  - TTE: EF of 61%. Mild (grade 1) diastolic dysfunction  - no volume overload   - trop 35 > 25     DVT ppx: Lovenox   GI ppx: PPI   Diet: DASH  Activity: AAT   Pending: started on Prednisone 40mg 2/23, taper 10 mg Q4d Transfer Note    Transfer from: CCU    Transfer to: SDU  ----------------------------------------------------------------------------------------------------------  HPI:  79-year-old female with past medical history of hypertension, chronic high bicarb (COPD?/smoker?), who came from urgent care for hypoxia in the setting of nasal congestion, cough, increasing shortness of breath over the past week. Patient was initially seen in urgent care to be evaluated with negative rapid flu and COVID. Notably patient was satting 80% on room air there and improved on 3 L nasal cannula. While in ED pt was taken off room air for a little and satted in 80's and was obtunded and agitated and SOB. Lorazepam 2 mg given. BIPAP placed with improvement in SOB but sleepy.  ED interview: Patient denies any fevers, nausea/vomiting, chest pain, urinary symptoms, leg swelling. My interview: Pt not waking up to sternal rub so could not conduct interview, likely from ativan vs co2 narcosis.      ED course:   VS all wnl except hypoxia, then hypotensive started on levo  trop 35, creat 1.6, bnp 2979 with no baseline, bicarb 31 chronic, lacate 2.8, vbg pH 7.3 to 7.1 to 7.2, pco2 from 50 to 118 to 96, RSV positive  received LR bolus, levo, lorazepam, calcium gluconate, duoneb and dexa.   --------------------------------------------------------------------------------------------------------  PMH/PSH:  PAST MEDICAL & SURGICAL HISTORY:  -------------------------------------------------------------------------------------------------------  Vital Signs Last 24 Hrs  T(C): 36 (23 Feb 2025 12:00), Max: 36.3 (22 Feb 2025 16:00)  T(F): 96.8 (23 Feb 2025 12:00), Max: 97.4 (22 Feb 2025 16:00)  HR: 80 (23 Feb 2025 12:00) (60 - 93)  BP: 112/58 (23 Feb 2025 12:00) (97/50 - 149/65)  BP(mean): 81 (23 Feb 2025 12:00) (66 - 94)  RR: 27 (23 Feb 2025 12:00) (13 - 47)  SpO2: 96% (23 Feb 2025 12:00) (75% - 100%)    Parameters below as of 23 Feb 2025 12:00  Patient On (Oxygen Delivery Method): nasal cannula  O2 Flow (L/min): 4    I&O's Summary    22 Feb 2025 07:01  -  23 Feb 2025 07:00  --------------------------------------------------------  IN: 500 mL / OUT: 700 mL / NET: -200 mL    23 Feb 2025 07:01  -  23 Feb 2025 13:05  --------------------------------------------------------  IN: 480 mL / OUT: 250 mL / NET: 230 mL  --------------------------------------------------------------------------------------------------------  LABS:                         11.4   8.87  )-----------( 186               38.8     142  |  101  |  42[H]  ----------------------------<  112[H]  4.9   |  32  |  1.2    Ca    8.7      23 Feb 2025 04:30  Phos  4.3     02-22  Mg     2.3     02-23    TPro  6.3  /  Alb  3.6  /  TBili  <0.2  /  DBili  x   /  AST  19  /  ALT  16  /  AlkPhos  68  02-23    ABG - ( 21 Feb 2025 20:11 )  pH, Arterial: 7.29  pH, Blood: x     /  pCO2: 77    /  pO2: 69    / HCO3: 37    / Base Excess: 7.5   /  SaO2: 96.0    ---------------------------------------------------------------------------------------------------  ASSESSMENT & PLAN:     79-year-old female with past medical history of hypertension, chronic hypercapnia, hx of smoking, ?COPD not on home oxygen, came from urgent care for hypoxia in the setting of nasal congestion, cough, increasing shortness of breath over the past week. Patient was initially seen in urgent care to be evaluated with negative rapid flu and COVID. Notably patient was satting 80% on room air there and improved on 3 L nasal cannula. While in ED pt was taken off room air for a little and satted in 80's and was obtunded and agitated and SOB. Lorazepam 2 mg given. BIPAP placed with improvement in SOB but sleepy.  ED interview: Patient denies any fevers, nausea/vomiting, chest pain, urinary symptoms, leg swelling. My interview: Pt not waking up to sternal rub so could not conduct interview, likely from ativan vs co2 narcosis.      #RSV positive   #acute on chronic hypercapnic respiratory failure  #septic shock 2/2 viral infection   #possible viral bronchitis  #hx of tobacco use  #undiagnosed COPD  #obtunded and agitated on admission  - Xray Chest (02.21.25) No radiographic evidence of acute cardiopulmonary disease.  - CT Angio Chest PE Protocol w/ IV Cont (02.20.25) No evidence of acute pulmonary embolism or acute thoracic pathology.  - CT Head No Cont (02.20.25): Partially motion degraded study. No definite evidence of acute intracranial pathology.  - ED vitals: trop 35, creat 1.6, bnp 2979, bicarb 31 chronic, lacate 2.8, vbg pH 7.3 to 7.1 to 7.2, pco2 from 50 to 118 to 96, RSV positive  - weaned off BiPAP   - weaned off Levo   - s/p solumedrol 40mg q12h  - start Prednisone 40 mg 2/23 and taper 10 mg Q4d  - c/w Duoneb  - MRSA negative   - BCx negative   - on d/c will need ICS/LABA, LAMA, PRN albuterol  - outpatient pulmonary f/u after d/c  - repeat CXR 2-3 months     #hx HTN   - hold home losartan 100mg daily iso shock + levo/ low BP    - hold home amlodipine 5mg daily   - monitor BP     #CKD  - Cr 1.6 (around baseline)   - monitor I&O  - monitor renal function, electrolytes    #?hx HFpEF  - TTE: EF of 61%. Mild (grade 1) diastolic dysfunction  - no volume overload   - trop 35 > 25     DVT ppx: Lovenox   GI ppx: PPI   Diet: DASH  Activity: AAT   Pending: started on Prednisone 40mg 2/23, taper 10 mg Q4d Transfer Note    Transfer from: CCU    Transfer to: SDU  ----------------------------------------------------------------------------------------------------------  HPI:  79-year-old female with past medical history of hypertension, chronic high bicarb (COPD?/smoker?), who came from urgent care for hypoxia in the setting of nasal congestion, cough, increasing shortness of breath over the past week. Patient was initially seen in urgent care to be evaluated with negative rapid flu and COVID. Notably patient was satting 80% on room air there and improved on 3 L nasal cannula. While in ED pt was taken off room air for a little and satted in 80's and was obtunded and agitated and SOB. Lorazepam 2 mg given. BIPAP placed with improvement in SOB but sleepy.  ED interview: Patient denies any fevers, nausea/vomiting, chest pain, urinary symptoms, leg swelling. My interview: Pt not waking up to sternal rub so could not conduct interview, likely from ativan vs co2 narcosis.      VS all wnl except hypoxia, then hypotensive started on levo  trop 35, creat 1.6, bnp 2979 with no baseline, bicarb 31 chronic, lacate 2.8, vbg pH 7.3 to 7.1 to 7.2, pco2 from 50 to 118 to 96, RSV positive  received LR bolus, levo, lorazepam, calcium gluconate, duoneb and dexa.   --------------------------------------------------------------------------------------------------------  PMH/PSH:  PAST MEDICAL & SURGICAL HISTORY:  -------------------------------------------------------------------------------------------------------  Vital Signs Last 24 Hrs  T(C): 36 (23 Feb 2025 12:00), Max: 36.3 (22 Feb 2025 16:00)  T(F): 96.8 (23 Feb 2025 12:00), Max: 97.4 (22 Feb 2025 16:00)  HR: 80 (23 Feb 2025 12:00) (60 - 93)  BP: 112/58 (23 Feb 2025 12:00) (97/50 - 149/65)  BP(mean): 81 (23 Feb 2025 12:00) (66 - 94)  RR: 27 (23 Feb 2025 12:00) (13 - 47)  SpO2: 96% (23 Feb 2025 12:00) (75% - 100%)    Parameters below as of 23 Feb 2025 12:00  Patient On (Oxygen Delivery Method): nasal cannula  O2 Flow (L/min): 4    I&O's Summary    22 Feb 2025 07:01  -  23 Feb 2025 07:00  --------------------------------------------------------  IN: 500 mL / OUT: 700 mL / NET: -200 mL    23 Feb 2025 07:01  -  23 Feb 2025 13:05  --------------------------------------------------------  IN: 480 mL / OUT: 250 mL / NET: 230 mL  --------------------------------------------------------------------------------------------------------  LABS:                         11.4   8.87  )-----------( 186               38.8     142  |  101  |  42[H]  ----------------------------<  112[H]  4.9   |  32  |  1.2    Ca    8.7      23 Feb 2025 04:30  Phos  4.3     02-22  Mg     2.3     02-23    TPro  6.3  /  Alb  3.6  /  TBili  <0.2  /  DBili  x   /  AST  19  /  ALT  16  /  AlkPhos  68  02-23    ABG - ( 21 Feb 2025 20:11 )  pH, Arterial: 7.29  pH, Blood: x     /  pCO2: 77    /  pO2: 69    / HCO3: 37    / Base Excess: 7.5   /  SaO2: 96.0    ---------------------------------------------------------------------------------------------------  ASSESSMENT & PLAN:     79-year-old female with past medical history of hypertension, chronic hypercapnia, hx of smoking, ?COPD not on home oxygen, came from urgent care for hypoxia in the setting of nasal congestion, cough, increasing shortness of breath over the past week. Patient was initially seen in urgent care to be evaluated with negative rapid flu and COVID. Notably patient was satting 80% on room air there and improved on 3 L nasal cannula. While in ED pt was taken off room air for a little and satted in 80's and was obtunded and agitated and SOB. Lorazepam 2 mg given. BIPAP placed with improvement in SOB but sleepy.  ED interview: Patient denies any fevers, nausea/vomiting, chest pain, urinary symptoms, leg swelling. My interview: Pt not waking up to sternal rub so could not conduct interview, likely from ativan vs co2 narcosis.      #RSV positive   #acute on chronic hypercapnic respiratory failure  #septic shock 2/2 viral infection   #possible viral bronchitis  #hx of tobacco use  #undiagnosed COPD  #obtunded and agitated on admission  - Xray Chest (02.21.25) No radiographic evidence of acute cardiopulmonary disease.  - CT Angio Chest PE Protocol w/ IV Cont (02.20.25) No evidence of acute pulmonary embolism or acute thoracic pathology.  - CT Head No Cont (02.20.25): Partially motion degraded study. No definite evidence of acute intracranial pathology.  - ED vitals: trop 35, creat 1.6, bnp 2979, bicarb 31 chronic, lacate 2.8, vbg pH 7.3 to 7.1 to 7.2, pco2 from 50 to 118 to 96, RSV positive  - weaned off BiPAP   - weaned off Levo   - s/p solumedrol 40mg q12h  - start Prednisone 40 mg 2/23 and taper 10 mg Q4d  - c/w Duoneb  - MRSA negative   - BCx negative   - on d/c will need ICS/LABA, LAMA, PRN albuterol  - outpatient pulmonary f/u after d/c  - repeat CXR 2-3 months     #hx HTN   - hold home losartan 100mg daily iso shock + levo/ low BP    - hold home amlodipine 5mg daily   - monitor BP     #CKD  - Cr 1.6 (around baseline)   - monitor I&O  - monitor renal function, electrolytes    #?hx HFpEF  - TTE: EF of 61%. Mild (grade 1) diastolic dysfunction  - no volume overload   - trop 35 > 25     DVT ppx: Lovenox   GI ppx: PPI   Diet: DASH  Activity: AAT   Pending: started on Prednisone 40mg 2/23, taper 10 mg Q4d

## 2025-02-23 NOTE — DIETITIAN INITIAL EVALUATION ADULT - PERTINENT LABORATORY DATA
02-23    142  |  101  |  42[H]  ----------------------------<  112[H]  4.9   |  32  |  1.2    Ca    8.7      23 Feb 2025 04:30  Phos  4.3     02-22  Mg     2.3     02-23    TPro  6.3  /  Alb  3.6  /  TBili  <0.2  /  DBili  x   /  AST  19  /  ALT  16  /  AlkPhos  68  02-23

## 2025-02-23 NOTE — PROGRESS NOTE ADULT - SUBJECTIVE AND OBJECTIVE BOX
Patient is a 79y old  Female who presents with a chief complaint of hypoxia rsv (23 Feb 2025 01:21)        HPI:  79-year-old female with past medical history of hypertension, chronic high bicarb (COPD?/smoker?), who came from urgent care for hypoxia in the setting of nasal congestion, cough, increasing shortness of breath over the past week. Patient was initially seen in urgent care to be evaluated with negative rapid flu and COVID. Notably patient was satting 80% on room air there and improved on 3 L nasal cannula. While in ED pt was taken off room air for a little and satted in 80's and was obtunded and agitated and SOB. Lorazepam 2 mg given. BIPAP placed with improvement in SOB but sleepy.  ED interview: Patient denies any fevers, nausea/vomiting, chest pain, urinary symptoms, leg swelling. My interview: Pt not waking up to sternal rub so could not conduct interview, likely from ativan vs co2 narcosis.      VS all wnl except hypoxia, then hypotensive started on levo  trop 35, creat 1.6, bnp 2979 with no baseline, bicarb 31 chronic, lacate 2.8, vbg pH 7.3 to 7.1 to 7.2, pco2 from 50 to 118 to 96, RSV positive  CT head and abd negative  CXR hyperinflated  received LR bolus, levo, lorazepam, calcium gluconate, duoneb and dexa (21 Feb 2025 01:46)      PAST MEDICAL & SURGICAL HISTORY:      FAMILY HISTORY:        Pt evaluated on rounds.  I reviewed the radiology tests and hospital record.    I reviewed previous notes on this patient.    Interval Events: No overnight events.      REVIEW OF SYSTEMS:   see HPI      OBJECTIVE:  ICU Vital Signs Last 24 Hrs  T(C): 36.2 (23 Feb 2025 04:00), Max: 36.6 (22 Feb 2025 12:00)  T(F): 97.2 (23 Feb 2025 04:00), Max: 97.8 (22 Feb 2025 12:00)  HR: 75 (23 Feb 2025 08:00) (60 - 93)  BP: 104/55 (23 Feb 2025 08:00) (97/50 - 149/65)  BP(mean): 74 (23 Feb 2025 08:00) (66 - 94)  ABP: --  ABP(mean): --  RR: 16 (23 Feb 2025 08:00) (13 - 47)  SpO2: 98% (23 Feb 2025 08:00) (75% - 100%)    O2 Parameters below as of 23 Feb 2025 08:00  Patient On (Oxygen Delivery Method): nasal cannula  O2 Flow (L/min): 2            02-22 @ 07:01  -  02-23 @ 07:00  --------------------------------------------------------  IN: 500 mL / OUT: 700 mL / NET: -200 mL    02-23 @ 07:01  - 02-23 @ 09:51  --------------------------------------------------------  IN: 240 mL / OUT: 0 mL / NET: 240 mL      CAPILLARY BLOOD GLUCOSE      POCT Blood Glucose.: 99 mg/dL (21 Feb 2025 23:33)        PHYSICAL EXAM:     · ENMT:   Airway patent,   Nasal mucosa clear.  Mouth with normal mucosa.   No thrush    · EYES:   Clear bilaterally,   pupils equal,   round and reactive to light.    · CARDIAC:   Normal rate,   regular rhythm.    Heart sounds S1, S2.   No murmurs, no rubs or gallops on auscultation  no edema        CAROTID:   normal systolic impulse  no bruits    · RESPIRATORY:   rales  normal chest expansion  no retractions or use of accessory muscles  percussion of chest demonstrates no hyperresonance or dullness    · GASTROINTESTINAL:  Abdomen soft,   non-tender,   + BS  liver/spleen not palpable    · SKIN:   Skin normal color for race,   warm, dry   No evidence of rash.    · HEME LYMPH:   no splenomegaly.  No cervical  lymphadenopathy.  no inguinal lymphadenopathy    HOSPITAL MEDICATIONS:  MEDICATIONS  (STANDING):  albuterol/ipratropium for Nebulization 3 milliLiter(s) Nebulizer every 6 hours  chlorhexidine 2% Cloths 1 Application(s) Topical daily  enoxaparin Injectable 30 milliGRAM(s) SubCutaneous every 24 hours  fluticasone propionate/ salmeterol 250-50 MICROgram(s) Diskus 1 Dose(s) Inhalation two times a day  methylPREDNISolone sodium succinate Injectable 40 milliGRAM(s) IV Push two times a day  norepinephrine Infusion 0.05 MICROgram(s)/kG/Min (5.53 mL/Hr) IV Continuous <Continuous>  pantoprazole  Injectable 40 milliGRAM(s) IV Push daily    MEDICATIONS  (PRN):    lactated ringers Bolus:   500 milliLiter(s), IV Bolus, once, infuse over 60 Minute(s), Stop After 1 Doses  lactated ringers Bolus:   1000 milliLiter(s), IV Bolus, once, infuse over 60 Minute(s), Stop After 1 Doses  Provider's Contact #: [phone]      LABS:                        11.4   8.87  )-----------( 186      ( 23 Feb 2025 04:30 )             38.8     02-23    142  |  101  |  42[H]  ----------------------------<  112[H]  4.9   |  32  |  1.2    Ca    8.7      23 Feb 2025 04:30  Phos  4.3     02-22  Mg     2.3     02-23    TPro  6.3  /  Alb  3.6  /  TBili  <0.2  /  DBili  x   /  AST  19  /  ALT  16  /  AlkPhos  68  02-23      Urinalysis Basic - ( 23 Feb 2025 04:30 )    Color: x / Appearance: x / SG: x / pH: x  Gluc: 112 mg/dL / Ketone: x  / Bili: x / Urobili: x   Blood: x / Protein: x / Nitrite: x   Leuk Esterase: x / RBC: x / WBC x   Sq Epi: x / Non Sq Epi: x / Bacteria: x      Arterial Blood Gas:  02-21 @ 20:11  7.29/77/69/37/96.0/7.5  ABG lactate: --  Arterial Blood Gas:  02-21 @ 14:54  7.22/83/110/34/98.7/3.8  ABG lactate: --          Culture - Blood (collected 20 Feb 2025 21:34)  Source: .Blood Blood-Peripheral  Preliminary Report (23 Feb 2025 09:01):    No growth at 48 Hours    Culture - Blood (collected 20 Feb 2025 21:34)  Source: .Blood Blood-Peripheral  Preliminary Report (23 Feb 2025 09:01):    No growth at 48 Hours                ABG - ( 21 Feb 2025 20:11 )  pH, Arterial: 7.29  pH, Blood: x     /  pCO2: 77    /  pO2: 69    / HCO3: 37    / Base Excess: 7.5   /  SaO2: 96.0                RADIOLOGY: Today I personally interpreted the latest CXR and other pertinent films.

## 2025-02-23 NOTE — CHART NOTE - NSCHARTNOTEFT_GEN_A_CORE
MEDICAL ATTENDING ACCEPTANCE NOTE  Above patient was admitted or upgraded to CCU and is now considered clinically stable for transfer to the medical floor for ongoing care by the unit attending physician. The unit attending will continue to manage the patient's care until the transfer to the medical floor is completed. Once on the medical floor, a dedicated medical team will be assigned to the patient.

## 2025-02-23 NOTE — DIETITIAN NUTRITION RISK NOTIFICATION - TREATMENT: THE FOLLOWING DIET HAS BEEN RECOMMENDED
Diet, DASH/TLC:   Sodium & Cholesterol Restricted  Supplement Feeding Modality:  Oral  Ensure Plus High Protein Cans or Servings Per Day:  1       Frequency:  Two Times a day (02-23-25 @ 21:05) [Pending Verification By Attending]  Diet, Regular:   DASH/TLC {Sodium & Cholesterol Restricted} (DASH) (02-22-25 @ 11:53) [Active]

## 2025-02-23 NOTE — PROGRESS NOTE ADULT - ASSESSMENT
Impression:  Acute hypercapnic respiratory failure due to COPD with exacerbation  hypoxia  RCV infection  Impending respiratory failure  no pneumonia possible viral bronchitis    Suggest:  Check O2 sat on NC, record, continue O2 as necessary to maintain sats > 90%  albuterol/ipratropium for Nebulization 3 milliLiter(s) Nebulizer every 6 hours  ID evaluation, change abx. as needed per their evaluation  methylPREDNISolone sodium succinate Injectable 40 milliGRAM(s) IV Push every 8hours  stable off AVAPS use hs/PRN.  , EPAP 8, min IPAP 14, max IPAP 25. O2 to keep Beth@ >92%.  OOB  GI and DVT prophylaxis  pulmonary toilet to prevent aspirations  off loading of skin to prevent pressure ulcers  Monitor clinically, convert to oral prednisone as clinical improvement allows  Upon D/C the patient will need ICS/LABA, LAMA, PRN albuterol, finish abx, slow taper of steroids ie. start Prednisone 40 mg and taper 10 mg Q4d.  Repeat CXR 2-3 months     can go to SDU

## 2025-02-23 NOTE — DIETITIAN INITIAL EVALUATION ADULT - NSFNSGIIOFT_GEN_A_CORE
Dx: 78y/o female with h/o HTN, CKD, chronic hypercapnia, smoking, questionable COPD (not on home oxygen) and questionable CHF, came from urgent care for hypoxia in the setting of nasal congestion, cough and increasing shortness of breath over the past week. Admitted with acute hypercapnic respiratory failure due to COPD with exacerbation, hypoxia, RSV infection and septic shock secondary to viral infection. MAP-80.

## 2025-02-23 NOTE — DIETITIAN INITIAL EVALUATION ADULT - LITERATURE/VIDEOS GIVEN
The importance of optimal PO intake toward a heart healthy diet and an oral supplement was discussed.

## 2025-02-23 NOTE — DIETITIAN INITIAL EVALUATION ADULT - ORAL INTAKE PTA/DIET HISTORY
The patient reports residing at home prior to admission; followed a regular diet; consumed two small meals at 50% secondary to poor appetite; denies chewing and swallowing difficulty. Took centrum silver multivitamin once daily. NKFA. Reports experiencing a 15# unintentional weight loss x6 months secondary to inadequate PO intake.

## 2025-02-23 NOTE — DIETITIAN INITIAL EVALUATION ADULT - COLLABORATION WITH OTHER PROVIDERS
I contacted the patient's physician name Dr. Reynoso via ([employer] #[phone]) and Teams regarding my nutritional recommendations.
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
## 2025-02-24 LAB
ALBUMIN SERPL ELPH-MCNC: 3.7 G/DL — SIGNIFICANT CHANGE UP (ref 3.5–5.2)
ALP SERPL-CCNC: 69 U/L — SIGNIFICANT CHANGE UP (ref 30–115)
ALT FLD-CCNC: 16 U/L — SIGNIFICANT CHANGE UP (ref 0–41)
ANION GAP SERPL CALC-SCNC: 7 MMOL/L — SIGNIFICANT CHANGE UP (ref 7–14)
AST SERPL-CCNC: 17 U/L — SIGNIFICANT CHANGE UP (ref 0–41)
BASOPHILS # BLD AUTO: 0.01 K/UL — SIGNIFICANT CHANGE UP (ref 0–0.2)
BASOPHILS NFR BLD AUTO: 0.1 % — SIGNIFICANT CHANGE UP (ref 0–1)
BILIRUB SERPL-MCNC: <0.2 MG/DL — SIGNIFICANT CHANGE UP (ref 0.2–1.2)
BUN SERPL-MCNC: 36 MG/DL — HIGH (ref 10–20)
CALCIUM SERPL-MCNC: 8.9 MG/DL — SIGNIFICANT CHANGE UP (ref 8.4–10.5)
CHLORIDE SERPL-SCNC: 98 MMOL/L — SIGNIFICANT CHANGE UP (ref 98–110)
CO2 SERPL-SCNC: 34 MMOL/L — HIGH (ref 17–32)
CREAT SERPL-MCNC: 1.3 MG/DL — SIGNIFICANT CHANGE UP (ref 0.7–1.5)
EGFR: 41 ML/MIN/1.73M2 — LOW
EGFR: 41 ML/MIN/1.73M2 — LOW
EOSINOPHIL # BLD AUTO: 0 K/UL — SIGNIFICANT CHANGE UP (ref 0–0.7)
EOSINOPHIL NFR BLD AUTO: 0 % — SIGNIFICANT CHANGE UP (ref 0–8)
GLUCOSE SERPL-MCNC: 122 MG/DL — HIGH (ref 70–99)
HCT VFR BLD CALC: 40.1 % — SIGNIFICANT CHANGE UP (ref 37–47)
HGB BLD-MCNC: 12 G/DL — SIGNIFICANT CHANGE UP (ref 12–16)
IMM GRANULOCYTES NFR BLD AUTO: 0.5 % — HIGH (ref 0.1–0.3)
LYMPHOCYTES # BLD AUTO: 0.41 K/UL — LOW (ref 1.2–3.4)
LYMPHOCYTES # BLD AUTO: 3.7 % — LOW (ref 20.5–51.1)
MAGNESIUM SERPL-MCNC: 2.2 MG/DL — SIGNIFICANT CHANGE UP (ref 1.8–2.4)
MCHC RBC-ENTMCNC: 26.7 PG — LOW (ref 27–31)
MCHC RBC-ENTMCNC: 29.9 G/DL — LOW (ref 32–37)
MCV RBC AUTO: 89.3 FL — SIGNIFICANT CHANGE UP (ref 81–99)
MONOCYTES # BLD AUTO: 0.34 K/UL — SIGNIFICANT CHANGE UP (ref 0.1–0.6)
MONOCYTES NFR BLD AUTO: 3.1 % — SIGNIFICANT CHANGE UP (ref 1.7–9.3)
NEUTROPHILS # BLD AUTO: 10.13 K/UL — HIGH (ref 1.4–6.5)
NEUTROPHILS NFR BLD AUTO: 92.6 % — HIGH (ref 42.2–75.2)
NRBC BLD AUTO-RTO: 0 /100 WBCS — SIGNIFICANT CHANGE UP (ref 0–0)
PLATELET # BLD AUTO: 210 K/UL — SIGNIFICANT CHANGE UP (ref 130–400)
PMV BLD: 9 FL — SIGNIFICANT CHANGE UP (ref 7.4–10.4)
POTASSIUM SERPL-MCNC: 5.6 MMOL/L — HIGH (ref 3.5–5)
POTASSIUM SERPL-SCNC: 5.6 MMOL/L — HIGH (ref 3.5–5)
PROT SERPL-MCNC: 6.4 G/DL — SIGNIFICANT CHANGE UP (ref 6–8)
RBC # BLD: 4.49 M/UL — SIGNIFICANT CHANGE UP (ref 4.2–5.4)
RBC # FLD: 13.2 % — SIGNIFICANT CHANGE UP (ref 11.5–14.5)
SODIUM SERPL-SCNC: 139 MMOL/L — SIGNIFICANT CHANGE UP (ref 135–146)
WBC # BLD: 10.94 K/UL — HIGH (ref 4.8–10.8)
WBC # FLD AUTO: 10.94 K/UL — HIGH (ref 4.8–10.8)

## 2025-02-24 PROCEDURE — 71045 X-RAY EXAM CHEST 1 VIEW: CPT | Mod: 26

## 2025-02-24 PROCEDURE — 99233 SBSQ HOSP IP/OBS HIGH 50: CPT

## 2025-02-24 RX ORDER — TIOTROPIUM BROMIDE INHALATION SPRAY 3.12 UG/1
2 SPRAY, METERED RESPIRATORY (INHALATION) DAILY
Refills: 0 | Status: DISCONTINUED | OUTPATIENT
Start: 2025-02-24 | End: 2025-03-03

## 2025-02-24 RX ADMIN — IPRATROPIUM BROMIDE AND ALBUTEROL SULFATE 3 MILLILITER(S): .5; 2.5 SOLUTION RESPIRATORY (INHALATION) at 14:08

## 2025-02-24 RX ADMIN — ENOXAPARIN SODIUM 30 MILLIGRAM(S): 100 INJECTION SUBCUTANEOUS at 11:52

## 2025-02-24 RX ADMIN — Medication 1 DOSE(S): at 08:00

## 2025-02-24 RX ADMIN — Medication 1 APPLICATION(S): at 11:54

## 2025-02-24 RX ADMIN — IPRATROPIUM BROMIDE AND ALBUTEROL SULFATE 3 MILLILITER(S): .5; 2.5 SOLUTION RESPIRATORY (INHALATION) at 01:40

## 2025-02-24 RX ADMIN — IPRATROPIUM BROMIDE AND ALBUTEROL SULFATE 3 MILLILITER(S): .5; 2.5 SOLUTION RESPIRATORY (INHALATION) at 19:44

## 2025-02-24 RX ADMIN — IPRATROPIUM BROMIDE AND ALBUTEROL SULFATE 3 MILLILITER(S): .5; 2.5 SOLUTION RESPIRATORY (INHALATION) at 07:42

## 2025-02-24 RX ADMIN — Medication 40 MILLIGRAM(S): at 11:52

## 2025-02-24 RX ADMIN — PREDNISONE 40 MILLIGRAM(S): 20 TABLET ORAL at 06:21

## 2025-02-24 RX ADMIN — TIOTROPIUM BROMIDE INHALATION SPRAY 2 PUFF(S): 3.12 SPRAY, METERED RESPIRATORY (INHALATION) at 11:11

## 2025-02-24 RX ADMIN — Medication 1 DOSE(S): at 20:34

## 2025-02-24 NOTE — PROGRESS NOTE ADULT - ASSESSMENT
Impression:    Acute hypercapnic respiratory failure due to COPD with exacerbation  hypoxia  RSV infection  Impending respiratory failure  no pneumonia possible viral bronchitis      PLAN:    CNS: No issues.     HEENT: Oral care    PULMONARY:  CXR no infiltrates. Prednisone taper over 7 days. Advair: increase to 500; add Spiriva. O2 via NC to keep spo2 more than 88%. She will need AVAP on DC.     CARDIOVASCULAR: No issues. Normal EF.     GI: GI prophylaxis.  Feeding as tolerated.     RENAL:  Follow up lytes.  Correct as needed. No willett.     INFECTIOUS DISEASE: Follow up cultures. Not on abx.     HEMATOLOGICAL:  DVT prophylaxis. CTA PE protocol negative.     ENDOCRINE:  Follow up FS.  Insulin protocol if needed.    MUSCULOSKELETAL: Out of bed.     Medical floor.

## 2025-02-24 NOTE — PROGRESS NOTE ADULT - SUBJECTIVE AND OBJECTIVE BOX
Patient is a 79y old  Female who presents with a chief complaint of Acute respiratory failure with hypoxia     (23 Feb 2025 20:36)        Over Night Events:    No issues   No fevers   No pressors         ROS:  See HPI    PHYSICAL EXAM    ICU Vital Signs Last 24 Hrs  T(C): 36.4 (23 Feb 2025 20:00), Max: 36.4 (23 Feb 2025 20:00)  T(F): 97.5 (23 Feb 2025 20:00), Max: 97.5 (23 Feb 2025 20:00)  HR: 89 (24 Feb 2025 06:00) (65 - 89)  BP: 149/74 (24 Feb 2025 06:00) (108/53 - 149/74)  BP(mean): 104 (24 Feb 2025 06:00) (77 - 104)  ABP: --  ABP(mean): --  RR: 36 (24 Feb 2025 06:00) (14 - 43)  SpO2: 98% (24 Feb 2025 06:00) (90% - 100%)    O2 Parameters below as of 24 Feb 2025 06:00  Patient On (Oxygen Delivery Method): nasal cannula  O2 Flow (L/min): 3          General: NAD   HEENT: ERICKA             Lymphatic system: No cervical LN   Lungs: Bilateral BS; wheezing   Cardiovascular: Regular   Gastrointestinal: Soft, Positive BS  Extremities: No clubbing.  Moves extremities.  Full Range of motion   Skin: Warm, intact  Neurological: No motor or sensory deficit       02-23-25 @ 07:01  -  02-24-25 @ 07:00  --------------------------------------------------------  IN:    Oral Fluid: 940 mL  Total IN: 940 mL    OUT:    Voided (mL): 850 mL  Total OUT: 850 mL    Total NET: 90 mL          LABS:                            12.0   10.94 )-----------( 210      ( 24 Feb 2025 04:57 )             40.1                                               02-24    139  |  98  |  36[H]  ----------------------------<  122[H]  5.6[H]   |  34[H]  |  1.3    Ca    8.9      24 Feb 2025 04:57  Mg     2.2     02-24    TPro  6.4  /  Alb  3.7  /  TBili  <0.2  /  DBili  x   /  AST  17  /  ALT  16  /  AlkPhos  69  02-24                                             Urinalysis Basic - ( 24 Feb 2025 04:57 )    Color: x / Appearance: x / SG: x / pH: x  Gluc: 122 mg/dL / Ketone: x  / Bili: x / Urobili: x   Blood: x / Protein: x / Nitrite: x   Leuk Esterase: x / RBC: x / WBC x   Sq Epi: x / Non Sq Epi: x / Bacteria: x                                                  LIVER FUNCTIONS - ( 24 Feb 2025 04:57 )  Alb: 3.7 g/dL / Pro: 6.4 g/dL / ALK PHOS: 69 U/L / ALT: 16 U/L / AST: 17 U/L / GGT: x                                                                                                                                       MEDICATIONS  (STANDING):  albuterol/ipratropium for Nebulization 3 milliLiter(s) Nebulizer every 6 hours  chlorhexidine 2% Cloths 1 Application(s) Topical daily  enoxaparin Injectable 30 milliGRAM(s) SubCutaneous every 24 hours  fluticasone propionate/ salmeterol 250-50 MICROgram(s) Diskus 1 Dose(s) Inhalation two times a day  norepinephrine Infusion 0.05 MICROgram(s)/kG/Min (5.53 mL/Hr) IV Continuous <Continuous>  pantoprazole  Injectable 40 milliGRAM(s) IV Push daily  predniSONE   Tablet 40 milliGRAM(s) Oral daily    MEDICATIONS  (PRN):      Xrays: no acute infiltrates                                                                                    ECHO

## 2025-02-24 NOTE — PROGRESS NOTE ADULT - SUBJECTIVE AND OBJECTIVE BOX
SAMMIE JESSICA 79y Female  MRN#: [id number]     Hospital Day: 3d    SUBJECTIVE     No overnight events. Patient seen and evaluated at bedside, has no acute complaints.                                             ----------------------------------------------------------  OBJECTIVE  PAST MEDICAL & SURGICAL HISTORY                                            -----------------------------------------------------------  ALLERGIES:  penicillins (Unknown)  sulfa drugs (Unknown)                                            ------------------------------------------------------------    HOME MEDICATIONS  Home Medications:  amLODIPine 5 mg oral tablet: 1 tab(s) orally once a day (21 Feb 2025 11:48)  Lexapro 20 mg oral tablet: 1 tab(s) orally once a day (21 Feb 2025 11:48)  losartan 100 mg oral tablet: 1 tab(s) orally once a day (21 Feb 2025 11:48)                           MEDICATIONS:  STANDING MEDICATIONS  albuterol/ipratropium for Nebulization 3 milliLiter(s) Nebulizer every 6 hours  chlorhexidine 2% Cloths 1 Application(s) Topical daily  enoxaparin Injectable 30 milliGRAM(s) SubCutaneous every 24 hours  fluticasone propionate/ salmeterol 500-50 MICROgram(s) Diskus 1 Dose(s) Inhalation two times a day  norepinephrine Infusion 0.05 MICROgram(s)/kG/Min IV Continuous <Continuous>  pantoprazole  Injectable 40 milliGRAM(s) IV Push daily  predniSONE   Tablet 40 milliGRAM(s) Oral daily  tiotropium 2.5 MICROgram(s) Inhaler 2 Puff(s) Inhalation daily    PRN MEDICATIONS                                            ------------------------------------------------------------  VITAL SIGNS: Last 24 Hours  T(C): 36.4 (23 Feb 2025 20:00), Max: 36.4 (23 Feb 2025 20:00)  T(F): 97.5 (23 Feb 2025 20:00), Max: 97.5 (23 Feb 2025 20:00)  HR: 85 (24 Feb 2025 09:00) (65 - 92)  BP: 166/72 (24 Feb 2025 09:00) (108/53 - 166/72)  BP(mean): 104 (24 Feb 2025 09:00) (77 - 104)  RR: 27 (24 Feb 2025 09:00) (14 - 43)  SpO2: 88% (24 Feb 2025 09:00) (88% - 100%)      02-23-25 @ 07:01  -  02-24-25 @ 07:00  --------------------------------------------------------  IN: 940 mL / OUT: 850 mL / NET: 90 mL                                             --------------------------------------------------------------  LABS:                        12.0   10.94 )-----------( 210      ( 24 Feb 2025 04:57 )             40.1     02-24    139  |  98  |  36[H]  ----------------------------<  122[H]  5.6[H]   |  34[H]  |  1.3    Ca    8.9      24 Feb 2025 04:57  Mg     2.2     02-24    TPro  6.4  /  Alb  3.7  /  TBili  <0.2  /  DBili  x   /  AST  17  /  ALT  16  /  AlkPhos  69  02-24                    PHYSICAL EXAM:  GENERAL: NAD  NECK: No JVD  CHEST/LUNG: B/l wheezes. No respiratory distress. On 2L NC.   HEART: regular rate and rhythm  ABDOMEN:  Soft, Nontender, Nondistended.    EXTREMITIES: No edema  NERVOUS SYSTEM:  Alert & Oriented X3. No focal deficits                                              --------------------------------------------------------------

## 2025-02-24 NOTE — PROGRESS NOTE ADULT - ASSESSMENT
IMPRESSION:     #Acute hypercapnic respiratory failure due to COPD with exacerbation  #Hypoxia  #RSV infection  #Impending respiratory failure  #No pneumonia possible viral bronchitis      PLAN:    CNS: No issues.     HEENT: Oral care    PULMONARY:  C/W prednisone taper x 7 days. Advair: increase to 500; add Spiriva. O2 via NC to keep spo2 more than 88%.  AVAP on DC.     CARDIOVASCULAR: Normal EF.     GI: GI prophylaxis. Feeding as tolerated.     RENAL:  Follow up lytes.  Correct as needed. No willett.     INFECTIOUS DISEASE: Not antibiotics indicated. Negative culture     HEMATOLOGICAL:  DVT prophylaxis.    ENDOCRINE:  Follow up FS.  Insulin protocol if needed.    MUSCULOSKELETAL: Out of bed.

## 2025-02-25 LAB
ALBUMIN SERPL ELPH-MCNC: 3.8 G/DL — SIGNIFICANT CHANGE UP (ref 3.5–5.2)
ALP SERPL-CCNC: 73 U/L — SIGNIFICANT CHANGE UP (ref 30–115)
ALT FLD-CCNC: 16 U/L — SIGNIFICANT CHANGE UP (ref 0–41)
ANION GAP SERPL CALC-SCNC: 6 MMOL/L — LOW (ref 7–14)
AST SERPL-CCNC: 16 U/L — SIGNIFICANT CHANGE UP (ref 0–41)
BASE EXCESS BLDA CALC-SCNC: 13.4 MMOL/L — HIGH (ref -2–3)
BASOPHILS # BLD AUTO: 0.02 K/UL — SIGNIFICANT CHANGE UP (ref 0–0.2)
BASOPHILS NFR BLD AUTO: 0.2 % — SIGNIFICANT CHANGE UP (ref 0–1)
BILIRUB SERPL-MCNC: <0.2 MG/DL — SIGNIFICANT CHANGE UP (ref 0.2–1.2)
BUN SERPL-MCNC: 33 MG/DL — HIGH (ref 10–20)
CALCIUM SERPL-MCNC: 8.4 MG/DL — SIGNIFICANT CHANGE UP (ref 8.4–10.5)
CHLORIDE SERPL-SCNC: 98 MMOL/L — SIGNIFICANT CHANGE UP (ref 98–110)
CO2 SERPL-SCNC: 36 MMOL/L — HIGH (ref 17–32)
CREAT SERPL-MCNC: 1.2 MG/DL — SIGNIFICANT CHANGE UP (ref 0.7–1.5)
EGFR: 45 ML/MIN/1.73M2 — LOW
EGFR: 45 ML/MIN/1.73M2 — LOW
EOSINOPHIL # BLD AUTO: 0.09 K/UL — SIGNIFICANT CHANGE UP (ref 0–0.7)
EOSINOPHIL NFR BLD AUTO: 0.8 % — SIGNIFICANT CHANGE UP (ref 0–8)
GLUCOSE SERPL-MCNC: 89 MG/DL — SIGNIFICANT CHANGE UP (ref 70–99)
HCO3 BLDA-SCNC: 39 MMOL/L — HIGH (ref 21–28)
HCT VFR BLD CALC: 39.4 % — SIGNIFICANT CHANGE UP (ref 37–47)
HGB BLD-MCNC: 12 G/DL — SIGNIFICANT CHANGE UP (ref 12–16)
IMM GRANULOCYTES NFR BLD AUTO: 0.7 % — HIGH (ref 0.1–0.3)
LYMPHOCYTES # BLD AUTO: 1.18 K/UL — LOW (ref 1.2–3.4)
LYMPHOCYTES # BLD AUTO: 10.7 % — LOW (ref 20.5–51.1)
MAGNESIUM SERPL-MCNC: 2.1 MG/DL — SIGNIFICANT CHANGE UP (ref 1.8–2.4)
MCHC RBC-ENTMCNC: 26.7 PG — LOW (ref 27–31)
MCHC RBC-ENTMCNC: 30.5 G/DL — LOW (ref 32–37)
MCV RBC AUTO: 87.6 FL — SIGNIFICANT CHANGE UP (ref 81–99)
MONOCYTES # BLD AUTO: 0.93 K/UL — HIGH (ref 0.1–0.6)
MONOCYTES NFR BLD AUTO: 8.4 % — SIGNIFICANT CHANGE UP (ref 1.7–9.3)
NEUTROPHILS # BLD AUTO: 8.76 K/UL — HIGH (ref 1.4–6.5)
NEUTROPHILS NFR BLD AUTO: 79.2 % — HIGH (ref 42.2–75.2)
NRBC BLD AUTO-RTO: 0 /100 WBCS — SIGNIFICANT CHANGE UP (ref 0–0)
PCO2 BLDA: 51 MMHG — HIGH (ref 32–45)
PH BLDA: 7.49 — HIGH (ref 7.35–7.45)
PLATELET # BLD AUTO: 217 K/UL — SIGNIFICANT CHANGE UP (ref 130–400)
PMV BLD: 9.2 FL — SIGNIFICANT CHANGE UP (ref 7.4–10.4)
PO2 BLDA: 53 MMHG — LOW (ref 83–108)
POTASSIUM SERPL-MCNC: 4.5 MMOL/L — SIGNIFICANT CHANGE UP (ref 3.5–5)
POTASSIUM SERPL-SCNC: 4.5 MMOL/L — SIGNIFICANT CHANGE UP (ref 3.5–5)
PROT SERPL-MCNC: 6.1 G/DL — SIGNIFICANT CHANGE UP (ref 6–8)
RBC # BLD: 4.5 M/UL — SIGNIFICANT CHANGE UP (ref 4.2–5.4)
RBC # FLD: 13.3 % — SIGNIFICANT CHANGE UP (ref 11.5–14.5)
SAO2 % BLDA: 89.5 % — LOW (ref 94–98)
SODIUM SERPL-SCNC: 140 MMOL/L — SIGNIFICANT CHANGE UP (ref 135–146)
WBC # BLD: 11.06 K/UL — HIGH (ref 4.8–10.8)
WBC # FLD AUTO: 11.06 K/UL — HIGH (ref 4.8–10.8)

## 2025-02-25 PROCEDURE — 99232 SBSQ HOSP IP/OBS MODERATE 35: CPT

## 2025-02-25 PROCEDURE — 71045 X-RAY EXAM CHEST 1 VIEW: CPT | Mod: 26

## 2025-02-25 RX ORDER — BISACODYL 5 MG
5 TABLET, DELAYED RELEASE (ENTERIC COATED) ORAL EVERY 12 HOURS
Refills: 0 | Status: DISCONTINUED | OUTPATIENT
Start: 2025-02-25 | End: 2025-03-03

## 2025-02-25 RX ADMIN — IPRATROPIUM BROMIDE AND ALBUTEROL SULFATE 3 MILLILITER(S): .5; 2.5 SOLUTION RESPIRATORY (INHALATION) at 20:07

## 2025-02-25 RX ADMIN — TIOTROPIUM BROMIDE INHALATION SPRAY 2 PUFF(S): 3.12 SPRAY, METERED RESPIRATORY (INHALATION) at 08:05

## 2025-02-25 RX ADMIN — PREDNISONE 40 MILLIGRAM(S): 20 TABLET ORAL at 05:08

## 2025-02-25 RX ADMIN — IPRATROPIUM BROMIDE AND ALBUTEROL SULFATE 3 MILLILITER(S): .5; 2.5 SOLUTION RESPIRATORY (INHALATION) at 03:41

## 2025-02-25 RX ADMIN — Medication 1 DOSE(S): at 09:12

## 2025-02-25 RX ADMIN — Medication 40 MILLIGRAM(S): at 11:02

## 2025-02-25 RX ADMIN — ENOXAPARIN SODIUM 30 MILLIGRAM(S): 100 INJECTION SUBCUTANEOUS at 13:02

## 2025-02-25 RX ADMIN — Medication 5 MILLIGRAM(S): at 15:08

## 2025-02-25 RX ADMIN — Medication 1 APPLICATION(S): at 11:03

## 2025-02-25 RX ADMIN — IPRATROPIUM BROMIDE AND ALBUTEROL SULFATE 3 MILLILITER(S): .5; 2.5 SOLUTION RESPIRATORY (INHALATION) at 08:05

## 2025-02-25 RX ADMIN — IPRATROPIUM BROMIDE AND ALBUTEROL SULFATE 3 MILLILITER(S): .5; 2.5 SOLUTION RESPIRATORY (INHALATION) at 13:04

## 2025-02-25 NOTE — CONSULT NOTE ADULT - ASSESSMENT
COSME, better  CKD stage 3, pt known to me  hyperkalemia, better  ARF improving  RSV / COPD exac    plan:    cont off losartan  cont off amlodipine  monitor BP's  trend renal function and lytes  prn lokelma  d/w family and Dr. Blanc

## 2025-02-25 NOTE — PROGRESS NOTE ADULT - ASSESSMENT
79-year-old female with past medical history of hypertension, chronic high bicarb (COPD?/smoker?), who came from urgent care for hypoxia in the setting of nasal congestion, cough, increasing shortness of breath over the past week. Patient was initially seen in urgent care to be evaluated with negative rapid flu and COVID. Notably patient was satting 80% on room air there and improved on 3 L nasal cannula. While in ED pt was taken off room air for a little and satted in 80's and was obtunded and agitated and SOB. Lorazepam 2 mg given. BIPAP placed with improvement in SOB but sleepy.  ED interview: Patient denies any fevers, nausea/vomiting, chest pain, urinary symptoms, leg swelling. My interview: Pt not waking up to sternal rub so could not conduct interview, likely from ativan vs co2 narcosis.      #RSV positive   #acute on chronic hypercapnic respiratory failure  #septic shock 2/2 viral infection   #possible viral bronchitis  #hx of tobacco use  #undiagnosed COPD  #obtunded and agitated on admission  - Xray Chest (02.21.25) No radiographic evidence of acute cardiopulmonary disease.  - CT Angio Chest PE Protocol w/ IV Cont (02.20.25) No evidence of acute pulmonary embolism or acute thoracic pathology.  - CT Head No Cont (02.20.25): Partially motion degraded study. No definite evidence of acute intracranial pathology.  - ED vitals: trop 35, creat 1.6, bnp 2979, bicarb 31 chronic, lacate 2.8, vbg pH 7.3 to 7.1 to 7.2, pco2 from 50 to 118 to 96, RSV positive  - weaned off BiPAP   - weaned off Levo   - s/p solumedrol 40mg q12h  - c/w Duoneb  - MRSA negative   - BCx negative   - on d/c will need ICS/LABA, LAMA, PRN albuterol  - outpatient pulmonary f/u after d/c  - repeat CXR 2-3 months   - cw Prednisone taper, 40 mg 2/23 and taper 10 mg Q4d  - Advair 500 and Spiriva  - O2 via NC to keep spo2 more than 88%  - AVAP on DC   - f/u repeat ABG  - wean O2 as juancho    #hx HTN   - hold home losartan 100mg daily iso shock + levo/ low BP    - hold home amlodipine 5mg daily   - monitor BP     #CKD  - Cr 1.6 (around baseline)   - monitor I&O  - monitor renal function, electrolytes    #?hx HFpEF  - TTE: EF of 61%. Mild (grade 1) diastolic dysfunction  - no volume overload   - trop 35 > 25     #MISC  DVT ppx: Lovenox   GI ppx: PPI   Diet: DASH  Activity: AAT     #PENDING  - cw Prednisone taper, 40 mg 2/23 and taper 10 mg Q4d  - Advair 500 and Spiriva  - O2 via NC to keep spo2 more than 88%  - AVAP on DC   - f/u repeat ABG  - wean O2 as juancho

## 2025-02-25 NOTE — CONSULT NOTE ADULT - SUBJECTIVE AND OBJECTIVE BOX
NEPHROLOGY CONSULTATION NOTE        PAST MEDICAL & SURGICAL HISTORY:    Allergies:  penicillins (Unknown)  sulfa drugs (Unknown)    Home Medications Reviewed    SOCIAL HISTORY:  Denies ETOH,Smoking,   FAMILY HISTORY:        REVIEW OF SYSTEMS:  All other review of systems is negative unless indicated above.    PHYSICAL EXAM:  Constitutional: NAD  HEENT: anicteric sclera, oropharynx clear, MMM  Neck: No JVD  Respiratory: decreased BS b/l  Cardiovascular: S1, S2, RRR  Gastrointestinal: BS+, soft, NT/ND  Extremities: No cyanosis or clubbing. No peripheral edema  Neurological: A/O x 3, no focal deficits  Psychiatric: Normal mood, normal affect  : No CVA tenderness. No willett.   Skin: No rashes    Hospital Medications:   MEDICATIONS  (STANDING):  albuterol/ipratropium for Nebulization 3 milliLiter(s) Nebulizer every 6 hours  chlorhexidine 2% Cloths 1 Application(s) Topical daily  enoxaparin Injectable 30 milliGRAM(s) SubCutaneous every 24 hours  fluticasone propionate/ salmeterol 500-50 MICROgram(s) Diskus 1 Dose(s) Inhalation two times a day  pantoprazole  Injectable 40 milliGRAM(s) IV Push daily  predniSONE   Tablet 40 milliGRAM(s) Oral daily  tiotropium 2.5 MICROgram(s) Inhaler 2 Puff(s) Inhalation daily        VITALS:  T(F): 98 (02-25-25 @ 12:47), Max: 98.8 (02-25-25 @ 05:00)  HR: 94 (02-25-25 @ 12:47)  BP: 129/73 (02-25-25 @ 12:47)  RR: 17 (02-25-25 @ 12:47)  SpO2: 94% (02-25-25 @ 12:47)  Wt(kg): --    02-23 @ 07:01  -  02-24 @ 07:00  --------------------------------------------------------  IN: 940 mL / OUT: 850 mL / NET: 90 mL    02-24 @ 07:01  -  02-25 @ 07:00  --------------------------------------------------------  IN: 1200 mL / OUT: 450 mL / NET: 750 mL    02-25 @ 07:01  -  02-25 @ 15:37  --------------------------------------------------------  IN: 0 mL / OUT: 650 mL / NET: -650 mL          LABS:  02-25    140  |  98  |  33[H]  ----------------------------<  89  4.5   |  36[H]  |  1.2    Ca    8.4      25 Feb 2025 04:56  Mg     2.1     02-25    TPro  6.1  /  Alb  3.8  /  TBili  <0.2  /  DBili      /  AST  16  /  ALT  16  /  AlkPhos  73  02-25                          12.0   11.06 )-----------( 217      ( 25 Feb 2025 04:56 )             39.4       Urine Studies:  Urinalysis Basic - ( 25 Feb 2025 04:56 )    Color:  / Appearance:  / SG:  / pH:   Gluc: 89 mg/dL / Ketone:   / Bili:  / Urobili:    Blood:  / Protein:  / Nitrite:    Leuk Esterase:  / RBC:  / WBC    Sq Epi:  / Non Sq Epi:  / Bacteria:           RADIOLOGY & ADDITIONAL STUDIES:   NEPHROLOGY CONSULTATION NOTE    79-year-old female with past medical history of hypertension, chronic high bicarb (COPD?/smoker?), who came from urgent care for hypoxia in the setting of nasal congestion, cough, increasing shortness of breath over the past week. Patient was initially seen in urgent care to be evaluated with negative rapid flu and COVID. Notably patient was satting 80% on room air there and improved on 3 L nasal cannula. While in ED pt was taken off room air for a little and satted in 80's and was obtunded and agitated and SOB. Lorazepam 2 mg given. BIPAP placed with improvement in SOB but sleepy.  ED interview: Patient denies any fevers, nausea/vomiting, chest pain, urinary symptoms, leg swelling. My interview: Pt not waking up to sternal rub so could not conduct interview, likely from ativan vs co2 narcosis.      VS all wnl except hypoxia, then hypotensive started on levo  trop 35, creat 1.6, bnp 2979 with no baseline, bicarb 31 chronic, lacate 2.8, vbg pH 7.3 to 7.1 to 7.2, pco2 from 50 to 118 to 96, RSV positive  CT head and abd negative  CXR hyperinflated  received LR bolus, levo, lorazepam, calcium gluconate, duoneb and dexa          PAST MEDICAL & SURGICAL HISTORY:    Allergies:  penicillins (Unknown)  sulfa drugs (Unknown)    Home Medications Reviewed    SOCIAL HISTORY:  Denies ETOH,Smoking,   FAMILY HISTORY:        REVIEW OF SYSTEMS:  All other review of systems is negative unless indicated above.    PHYSICAL EXAM:  Constitutional: NAD  HEENT: anicteric sclera, oropharynx clear, MMM  Neck: No JVD  Respiratory: decreased BS b/l  Cardiovascular: S1, S2, RRR  Gastrointestinal: BS+, soft, NT/ND  Extremities: No cyanosis or clubbing. No peripheral edema  Neurological: A/O x 3, no focal deficits  Psychiatric: Normal mood, normal affect  : No CVA tenderness. No willett.   Skin: No rashes    Hospital Medications:   MEDICATIONS  (STANDING):  albuterol/ipratropium for Nebulization 3 milliLiter(s) Nebulizer every 6 hours  chlorhexidine 2% Cloths 1 Application(s) Topical daily  enoxaparin Injectable 30 milliGRAM(s) SubCutaneous every 24 hours  fluticasone propionate/ salmeterol 500-50 MICROgram(s) Diskus 1 Dose(s) Inhalation two times a day  pantoprazole  Injectable 40 milliGRAM(s) IV Push daily  predniSONE   Tablet 40 milliGRAM(s) Oral daily  tiotropium 2.5 MICROgram(s) Inhaler 2 Puff(s) Inhalation daily        VITALS:  T(F): 98 (02-25-25 @ 12:47), Max: 98.8 (02-25-25 @ 05:00)  HR: 94 (02-25-25 @ 12:47)  BP: 129/73 (02-25-25 @ 12:47)  RR: 17 (02-25-25 @ 12:47)  SpO2: 94% (02-25-25 @ 12:47)  Wt(kg): --    02-23 @ 07:01  -  02-24 @ 07:00  --------------------------------------------------------  IN: 940 mL / OUT: 850 mL / NET: 90 mL    02-24 @ 07:01  -  02-25 @ 07:00  --------------------------------------------------------  IN: 1200 mL / OUT: 450 mL / NET: 750 mL    02-25 @ 07:01  -  02-25 @ 15:37  --------------------------------------------------------  IN: 0 mL / OUT: 650 mL / NET: -650 mL          LABS:  02-25    140  |  98  |  33[H]  ----------------------------<  89  4.5   |  36[H]  |  1.2    Ca    8.4      25 Feb 2025 04:56  Mg     2.1     02-25    TPro  6.1  /  Alb  3.8  /  TBili  <0.2  /  DBili      /  AST  16  /  ALT  16  /  AlkPhos  73  02-25                          12.0   11.06 )-----------( 217      ( 25 Feb 2025 04:56 )             39.4       Urine Studies:  Urinalysis Basic - ( 25 Feb 2025 04:56 )    Color:  / Appearance:  / SG:  / pH:   Gluc: 89 mg/dL / Ketone:   / Bili:  / Urobili:    Blood:  / Protein:  / Nitrite:    Leuk Esterase:  / RBC:  / WBC    Sq Epi:  / Non Sq Epi:  / Bacteria:           RADIOLOGY & ADDITIONAL STUDIES:

## 2025-02-25 NOTE — PROGRESS NOTE ADULT - ATTENDING COMMENTS
HPI:  79-year-old female with pmhx HTN, and ? depression,  ex smoker , who came from urgent care for hypoxia in the setting of nasal congestion, cough, increasing shortness of breath over the past week.     # acute hypoxic resp failure due to RSV on possible copd ( ex smoker 40py, quit 25 yrs ago)   cont avap per pulm   cont nebs  cont o2 as needed     # deconditioning, needs pt eval     # HTN, now not on meds, but bp is controlled   Home Medications:  amLODIPine 5 mg oral tablet: 1 tab(s) orally once a day (21 Feb 2025 11:48)  losartan 100 mg oral tablet: 1 tab(s) orally once a day (21 Feb 2025 11:48)    # ?depression   Lexapro 20 mg oral tablet: 1 tab(s) orally once a day (21 Feb 2025 11:48)    #Progress Note Handoff    Pending :  PT eval   Family discussion: dw pt   Disposition: tBD after PT eval , if going home, she will need Home O2, and AVAP   code status: full code

## 2025-02-25 NOTE — PROGRESS NOTE ADULT - SUBJECTIVE AND OBJECTIVE BOX
SUBJECTIVE/OVERNIGHT EVENTS  Today is hospital day 4d. This morning patient was seen and examined at bedside, resting comfortably in bed. No acute or major events overnight. Pt says she overall feels better today. Pt on 3L NC, does not use oxygen at home.     MEDICATIONS  STANDING MEDICATIONS  albuterol/ipratropium for Nebulization 3 milliLiter(s) Nebulizer every 6 hours  chlorhexidine 2% Cloths 1 Application(s) Topical daily  enoxaparin Injectable 30 milliGRAM(s) SubCutaneous every 24 hours  fluticasone propionate/ salmeterol 500-50 MICROgram(s) Diskus 1 Dose(s) Inhalation two times a day  pantoprazole  Injectable 40 milliGRAM(s) IV Push daily  predniSONE   Tablet 40 milliGRAM(s) Oral daily  tiotropium 2.5 MICROgram(s) Inhaler 2 Puff(s) Inhalation daily    PRN MEDICATIONS    VITALS  T(F): 98 (02-25-25 @ 12:47), Max: 98.8 (02-25-25 @ 05:00)  HR: 94 (02-25-25 @ 12:47) (83 - 94)  BP: 129/73 (02-25-25 @ 12:47) (121/56 - 167/79)  RR: 17 (02-25-25 @ 12:47) (17 - 53)  SpO2: 94% (02-25-25 @ 12:47) (89% - 97%)    PHYSICAL EXAM  GENERAL: NAD, lying in bed comfortably  HEAD:  Atraumatic, normocephalic  EYES: EOMI, conjunctiva and sclera clear  NECK: Supple, no JVD  HEART: Regular rate and rhythm, no murmurs  LUNGS: Unlabored respirations. Mild wheezing B/L, on NC  ABDOMEN: Soft, nontender, nondistended, +BS  EXTREMITIES: 2+ peripheral pulses bilaterally. No clubbing, cyanosis, or edema  NERVOUS SYSTEM:  A&Ox3, no focal deficits   SKIN: No rashes or lesions    LABS             12.0   11.06 )-----------( 217      ( 02-25-25 @ 04:56 )             39.4     140  |  98  |  33  -------------------------<  89   02-25-25 @ 04:56  4.5  |  36  |  1.2    Ca      8.4     02-25-25 @ 04:56  Mg     2.1     02-25-25 @ 04:56    TPro  6.1  /  Alb  3.8  /  TBili  <0.2  /  DBili  x   /  AST  16  /  ALT  16  /  AlkPhos  73  /  GGT  x     02-25-25 @ 04:56        Urinalysis Basic - ( 25 Feb 2025 04:56 )    Color: x / Appearance: x / SG: x / pH: x  Gluc: 89 mg/dL / Ketone: x  / Bili: x / Urobili: x   Blood: x / Protein: x / Nitrite: x   Leuk Esterase: x / RBC: x / WBC x   Sq Epi: x / Non Sq Epi: x / Bacteria: x      ABG - ( 25 Feb 2025 11:42 )  pH, Arterial: 7.49  pH, Blood: x     /  pCO2: 51    /  pO2: 53    / HCO3: 39    / Base Excess: 13.4  /  SaO2: 89.5

## 2025-02-26 LAB
ALBUMIN SERPL ELPH-MCNC: 3.6 G/DL — SIGNIFICANT CHANGE UP (ref 3.5–5.2)
ALP SERPL-CCNC: 73 U/L — SIGNIFICANT CHANGE UP (ref 30–115)
ALT FLD-CCNC: 17 U/L — SIGNIFICANT CHANGE UP (ref 0–41)
ANION GAP SERPL CALC-SCNC: 9 MMOL/L — SIGNIFICANT CHANGE UP (ref 7–14)
AST SERPL-CCNC: 18 U/L — SIGNIFICANT CHANGE UP (ref 0–41)
BASOPHILS # BLD AUTO: 0.01 K/UL — SIGNIFICANT CHANGE UP (ref 0–0.2)
BASOPHILS NFR BLD AUTO: 0.1 % — SIGNIFICANT CHANGE UP (ref 0–1)
BILIRUB SERPL-MCNC: 0.3 MG/DL — SIGNIFICANT CHANGE UP (ref 0.2–1.2)
BUN SERPL-MCNC: 30 MG/DL — HIGH (ref 10–20)
CALCIUM SERPL-MCNC: 8.4 MG/DL — SIGNIFICANT CHANGE UP (ref 8.4–10.5)
CHLORIDE SERPL-SCNC: 96 MMOL/L — LOW (ref 98–110)
CO2 SERPL-SCNC: 35 MMOL/L — HIGH (ref 17–32)
CREAT SERPL-MCNC: 1.3 MG/DL — SIGNIFICANT CHANGE UP (ref 0.7–1.5)
CULTURE RESULTS: SIGNIFICANT CHANGE UP
CULTURE RESULTS: SIGNIFICANT CHANGE UP
EGFR: 41 ML/MIN/1.73M2 — LOW
EGFR: 41 ML/MIN/1.73M2 — LOW
EOSINOPHIL # BLD AUTO: 0.27 K/UL — SIGNIFICANT CHANGE UP (ref 0–0.7)
EOSINOPHIL NFR BLD AUTO: 2.2 % — SIGNIFICANT CHANGE UP (ref 0–8)
GLUCOSE SERPL-MCNC: 84 MG/DL — SIGNIFICANT CHANGE UP (ref 70–99)
HCT VFR BLD CALC: 40.9 % — SIGNIFICANT CHANGE UP (ref 37–47)
HGB BLD-MCNC: 12.4 G/DL — SIGNIFICANT CHANGE UP (ref 12–16)
IMM GRANULOCYTES NFR BLD AUTO: 0.8 % — HIGH (ref 0.1–0.3)
LYMPHOCYTES # BLD AUTO: 1.21 K/UL — SIGNIFICANT CHANGE UP (ref 1.2–3.4)
LYMPHOCYTES # BLD AUTO: 9.8 % — LOW (ref 20.5–51.1)
MAGNESIUM SERPL-MCNC: 2.1 MG/DL — SIGNIFICANT CHANGE UP (ref 1.8–2.4)
MCHC RBC-ENTMCNC: 27 PG — SIGNIFICANT CHANGE UP (ref 27–31)
MCHC RBC-ENTMCNC: 30.3 G/DL — LOW (ref 32–37)
MCV RBC AUTO: 89.1 FL — SIGNIFICANT CHANGE UP (ref 81–99)
MONOCYTES # BLD AUTO: 1.07 K/UL — HIGH (ref 0.1–0.6)
MONOCYTES NFR BLD AUTO: 8.6 % — SIGNIFICANT CHANGE UP (ref 1.7–9.3)
NEUTROPHILS # BLD AUTO: 9.72 K/UL — HIGH (ref 1.4–6.5)
NEUTROPHILS NFR BLD AUTO: 78.5 % — HIGH (ref 42.2–75.2)
NRBC BLD AUTO-RTO: 0 /100 WBCS — SIGNIFICANT CHANGE UP (ref 0–0)
PLATELET # BLD AUTO: 266 K/UL — SIGNIFICANT CHANGE UP (ref 130–400)
PMV BLD: 9.4 FL — SIGNIFICANT CHANGE UP (ref 7.4–10.4)
POTASSIUM SERPL-MCNC: 4.5 MMOL/L — SIGNIFICANT CHANGE UP (ref 3.5–5)
POTASSIUM SERPL-SCNC: 4.5 MMOL/L — SIGNIFICANT CHANGE UP (ref 3.5–5)
PROT SERPL-MCNC: 6.2 G/DL — SIGNIFICANT CHANGE UP (ref 6–8)
RBC # BLD: 4.59 M/UL — SIGNIFICANT CHANGE UP (ref 4.2–5.4)
RBC # FLD: 13.4 % — SIGNIFICANT CHANGE UP (ref 11.5–14.5)
SODIUM SERPL-SCNC: 140 MMOL/L — SIGNIFICANT CHANGE UP (ref 135–146)
SPECIMEN SOURCE: SIGNIFICANT CHANGE UP
SPECIMEN SOURCE: SIGNIFICANT CHANGE UP
WBC # BLD: 12.38 K/UL — HIGH (ref 4.8–10.8)
WBC # FLD AUTO: 12.38 K/UL — HIGH (ref 4.8–10.8)

## 2025-02-26 PROCEDURE — 99233 SBSQ HOSP IP/OBS HIGH 50: CPT

## 2025-02-26 PROCEDURE — 71045 X-RAY EXAM CHEST 1 VIEW: CPT | Mod: 26

## 2025-02-26 RX ADMIN — PREDNISONE 40 MILLIGRAM(S): 20 TABLET ORAL at 05:09

## 2025-02-26 RX ADMIN — IPRATROPIUM BROMIDE AND ALBUTEROL SULFATE 3 MILLILITER(S): .5; 2.5 SOLUTION RESPIRATORY (INHALATION) at 19:32

## 2025-02-26 RX ADMIN — IPRATROPIUM BROMIDE AND ALBUTEROL SULFATE 3 MILLILITER(S): .5; 2.5 SOLUTION RESPIRATORY (INHALATION) at 08:18

## 2025-02-26 RX ADMIN — IPRATROPIUM BROMIDE AND ALBUTEROL SULFATE 3 MILLILITER(S): .5; 2.5 SOLUTION RESPIRATORY (INHALATION) at 13:28

## 2025-02-26 RX ADMIN — ENOXAPARIN SODIUM 30 MILLIGRAM(S): 100 INJECTION SUBCUTANEOUS at 10:04

## 2025-02-26 RX ADMIN — IPRATROPIUM BROMIDE AND ALBUTEROL SULFATE 3 MILLILITER(S): .5; 2.5 SOLUTION RESPIRATORY (INHALATION) at 01:28

## 2025-02-26 RX ADMIN — Medication 1 APPLICATION(S): at 11:45

## 2025-02-26 RX ADMIN — TIOTROPIUM BROMIDE INHALATION SPRAY 2 PUFF(S): 3.12 SPRAY, METERED RESPIRATORY (INHALATION) at 08:18

## 2025-02-26 RX ADMIN — Medication 40 MILLIGRAM(S): at 11:42

## 2025-02-26 NOTE — PROGRESS NOTE ADULT - SUBJECTIVE AND OBJECTIVE BOX
SUBJECTIVE/OVERNIGHT EVENTS  Today is hospital day 5d. This morning patient was seen and examined at bedside, resting comfortably in bed. No acute or major events overnight. Pt on BiPAP this morning.     MEDICATIONS  STANDING MEDICATIONS  albuterol/ipratropium for Nebulization 3 milliLiter(s) Nebulizer every 6 hours  chlorhexidine 2% Cloths 1 Application(s) Topical daily  enoxaparin Injectable 30 milliGRAM(s) SubCutaneous every 24 hours  fluticasone propionate/ salmeterol 500-50 MICROgram(s) Diskus 1 Dose(s) Inhalation two times a day  pantoprazole  Injectable 40 milliGRAM(s) IV Push daily  predniSONE   Tablet 40 milliGRAM(s) Oral daily  tiotropium 2.5 MICROgram(s) Inhaler 2 Puff(s) Inhalation daily    PRN MEDICATIONS  bisacodyl 5 milliGRAM(s) Oral every 12 hours PRN    VITALS  T(F): 97.7 (02-26-25 @ 05:00), Max: 98 (02-25-25 @ 12:47)  HR: 82 (02-26-25 @ 05:00) (82 - 94)  BP: 125/72 (02-26-25 @ 05:00) (125/72 - 146/78)  RR: 17 (02-26-25 @ 05:00) (17 - 18)  SpO2: 95% (02-26-25 @ 05:00) (93% - 98%)    PHYSICAL EXAM  GENERAL: NAD, lying in bed comfortably  HEAD:  Atraumatic, normocephalic  EYES: EOMI, conjunctiva and sclera clear  NECK: Supple, no JVD  HEART: Regular rate and rhythm, no murmurs  LUNGS: Unlabored respirations. Mild wheezing B/L, on NC  ABDOMEN: Soft, nontender, nondistended, +BS  EXTREMITIES: 2+ peripheral pulses bilaterally. No clubbing, cyanosis, or edema  NERVOUS SYSTEM:  A&Ox3, no focal deficits   SKIN: No rashes or lesions    LABS             12.4   12.38 )-----------( 266      ( 02-26-25 @ 06:13 )             40.9     140  |  96  |  30  -------------------------<  84   02-26-25 @ 06:13  4.5  |  35  |  1.3    Ca      8.4     02-26-25 @ 06:13  Mg     2.1     02-26-25 @ 06:13    TPro  6.2  /  Alb  3.6  /  TBili  0.3  /  DBili  x   /  AST  18  /  ALT  17  /  AlkPhos  73  /  GGT  x     02-26-25 @ 06:13        Urinalysis Basic - ( 26 Feb 2025 06:13 )    Color: x / Appearance: x / SG: x / pH: x  Gluc: 84 mg/dL / Ketone: x  / Bili: x / Urobili: x   Blood: x / Protein: x / Nitrite: x   Leuk Esterase: x / RBC: x / WBC x   Sq Epi: x / Non Sq Epi: x / Bacteria: x      ABG - ( 25 Feb 2025 11:42 )  pH, Arterial: 7.49  pH, Blood: x     /  pCO2: 51    /  pO2: 53    / HCO3: 39    / Base Excess: 13.4  /  SaO2: 89.5

## 2025-02-26 NOTE — PROGRESS NOTE ADULT - ASSESSMENT
79-year-old female with past medical history of hypertension, chronic high bicarb (COPD?/smoker?), who came from urgent care for hypoxia in the setting of nasal congestion, cough, increasing shortness of breath over the past week. Patient was initially seen in urgent care to be evaluated with negative rapid flu and COVID. Notably patient was satting 80% on room air there and improved on 3 L nasal cannula. While in ED pt was taken off room air for a little and satted in 80's and was obtunded and agitated and SOB. Lorazepam 2 mg given. BIPAP placed with improvement in SOB but sleepy.  ED interview: Patient denies any fevers, nausea/vomiting, chest pain, urinary symptoms, leg swelling. My interview: Pt not waking up to sternal rub so could not conduct interview, likely from ativan vs co2 narcosis.      #RSV positive   #acute on chronic hypercapnic respiratory failure  #septic shock 2/2 viral infection   #possible viral bronchitis  #hx of tobacco use  #undiagnosed COPD  #obtunded and agitated on admission  - Xray Chest (02.21.25) No radiographic evidence of acute cardiopulmonary disease.  - CT Angio Chest PE Protocol w/ IV Cont (02.20.25) No evidence of acute pulmonary embolism or acute thoracic pathology.  - CT Head No Cont (02.20.25): Partially motion degraded study. No definite evidence of acute intracranial pathology.  - ED vitals: trop 35, creat 1.6, bnp 2979, bicarb 31 chronic, lacate 2.8, vbg pH 7.3 to 7.1 to 7.2, pco2 from 50 to 118 to 96, RSV positive  - weaned off BiPAP   - weaned off Levo   - s/p solumedrol 40mg q12h  - c/w Duoneb  - MRSA negative   - BCx negative   - on d/c will need ICS/LABA, LAMA, PRN albuterol  - outpatient pulmonary f/u after d/c  - repeat CXR 2-3 months   - cw Prednisone taper, 40 mg 2/23 and taper 10 mg Q4d  - Advair 500 and Spiriva  - O2 via NC to keep spo2 more than 88%  - repeat ABG improved  - AVAP on DC: settings per pulm: EPAP 8 IPAP 14-20 goal  Backup Rate 12  - wean O2 as juancho  - f/u ambulatory O2    #hx HTN   - hold home losartan 100mg daily iso shock + levo/ low BP    - hold home amlodipine 5mg daily   - monitor BP     #CKD  - Cr 1.6 (around baseline)   - monitor I&O  - monitor renal function, electrolytes    #?hx HFpEF  - TTE: EF of 61%. Mild (grade 1) diastolic dysfunction  - no volume overload   - trop 35 > 25     #MISC  DVT ppx: Lovenox   GI ppx: PPI   Diet: DASH  Activity: AAT     #PENDING  - cw Prednisone taper, 40 mg 2/23 and taper 10 mg Q4d  - Advair 500 and Spiriva  - O2 via NC to keep spo2 more than 88%  - repeat ABG improved  - AVAP on DC: settings per pulm: EPAP 8 IPAP 14-20 goal  Backup Rate 12  - wean O2 as juancho  - f/u ambulatory O2

## 2025-02-26 NOTE — PHYSICAL THERAPY INITIAL EVALUATION ADULT - ADDITIONAL COMMENTS
Pt lives in , has ~ 4 steps to enter, no steps inside, daughter lives on 2nd floor, grandson in basement apt. Pt was indep with amb and ADL's PTA.

## 2025-02-26 NOTE — PROGRESS NOTE ADULT - ASSESSMENT
79-year-old female with past medical history of hypertension, chronic high bicarb (COPD?/smoker?), who came from urgent care for hypoxia in the setting of nasal congestion, cough, increasing shortness of breath over the past week. Patient was initially seen in urgent care to be evaluated with negative rapid flu and COVID. Notably patient was satting 80% on room air there and improved on 3 L nasal cannula. While in ED pt was taken off room air for a little and satted in 80's and was obtunded and agitated and SOB. Lorazepam 2 mg given. BIPAP placed with improvement in SOB but sleepy.  ED interview: Patient denies any fevers, nausea/vomiting, chest pain, urinary symptoms, leg swelling. My interview: Pt not waking up to sternal rub so could not conduct interview, likely from ativan vs co2 narcosis.      # Altered Mental Status likely due to toxic vs Metabolic encephalopathy, resolving   # Acute on chronic hypercapnic respiratory failure    possibly due to undiagnosed COPD exacerbation from RSV   #septic shock likely 2/2 viral infection   #hx of tobacco use  - Xray Chest (02.21.25) No radiographic evidence of acute cardiopulmonary disease.  - CT Angio Chest PE Protocol w/ IV Cont (02.20.25) No evidence of acute pulmonary embolism or acute thoracic pathology.  - CT Head No Cont (02.20.25): Partially motion degraded study. No definite evidence of acute intracranial pathology.  - s/p solumedrol 40mg q12h > now on prednisone.   - c/w Duoneb  - MRSA negative   - BCx negative   - on d/c will need ICS/LABA, LAMA, PRN albuterol  - outpatient pulmonary f/u after d/c  - repeat CXR 2-3 months   - cw Prednisone taper, 40 mg 2/23 and taper 10 mg Q4d  - Advair 500 and Spiriva  - O2 via NC to keep spo2 more than 88%  - repeat ABG shows improved PCo2  - AVAP on DC: settings per pulm: EPAP 8 IPAP 14-20 goal  Backup Rate 12  - wean O2 as juancho/  f/u ambulatory O2 > might need Home O2     #hx HTN   - hold home losartan 100mg daily iso shock + levo/ low BP    - hold home amlodipine 5mg daily   - monitor BP     #CKD  - Cr 1.6 (around baseline)   - monitor I&O  - monitor renal function, electrolytes    #?hx HFpEF  - TTE: EF of 61%. Mild (grade 1) diastolic dysfunction  - no volume overload   - trop 35 > 25     #MISC  DVT ppx: Lovenox   GI ppx: PPI   Diet: DASH  Activity: AAT     #Pending: AVAP setup/ ? Home O2/ PT    Dispo: TBD     Plan d/w pt and the family at bedside.

## 2025-02-26 NOTE — PHYSICAL THERAPY INITIAL EVALUATION ADULT - GAIT TRAINING, PT EVAL
Improve amb to 120' with supervision using RW by d/c. Pt to negotiate 4 steps using 1 or 2 hands using HR with supervision by d/patricia.

## 2025-02-26 NOTE — PROGRESS NOTE ADULT - ASSESSMENT
COSME, better  CKD stage 3   hyperkalemia, better  ARF improving  RSV / COPD exac    plan:    cont off losartan  cont off amlodipine  monitor BP's  trend renal function and lytes  prn lokelma  full code

## 2025-02-26 NOTE — PROGRESS NOTE ADULT - SUBJECTIVE AND OBJECTIVE BOX
SAMMIE JESSICA  79y  Female      Patient is a 79y old  Female who presents with a chief complaint of hypoxia rsv.    INTERVAL HPI/OVERNIGHT EVENTS:      ******************************* REVIEW OF SYSTEMS:**********************************************  feeling better   All other review of systems negative    *********************** VITALS ******************************************    T(F): 98 (02-26-25 @ 11:32)  HR: 95 (02-26-25 @ 11:32) (82 - 95)  BP: 115/73 (02-26-25 @ 11:32) (115/73 - 146/78)  RR: 18 (02-26-25 @ 11:32) (17 - 18)  SpO2: 93% (02-26-25 @ 11:32) (93% - 98%)    02-25-25 @ 07:01  -  02-26-25 @ 07:00  --------------------------------------------------------  IN: 0 mL / OUT: 650 mL / NET: -650 mL    02-26-25 @ 07:01  -  02-26-25 @ 15:29  --------------------------------------------------------  IN: 0 mL / OUT: 400 mL / NET: -400 mL            02-25-25 @ 07:01  -  02-26-25 @ 07:00  --------------------------------------------------------  IN: 0 mL / OUT: 650 mL / NET: -650 mL    02-26-25 @ 07:01  -  02-26-25 @ 15:29  --------------------------------------------------------  IN: 0 mL / OUT: 400 mL / NET: -400 mL        ******************************** PHYSICAL EXAM:**************************************************  GENERAL: NAD    PSYCH: no agitation, baseline mentation  HEENT:     NERVOUS SYSTEM:  Alert & Oriented X3,   PULMONARY: NHI, CTA    CARDIOVASCULAR: S1S2 RRR    GI: Soft, NT, ND; BS present.    EXTREMITIES:  2+ Peripheral Pulses, No clubbing, cyanosis, or edema    LYMPH: No lymphadenopathy noted    SKIN: No rashes or lesions      **************************** LABS *******************************************************                          12.4   12.38 )-----------( 266      ( 26 Feb 2025 06:13 )             40.9     02-26    140  |  96[L]  |  30[H]  ----------------------------<  84  4.5   |  35[H]  |  1.3    Ca    8.4      26 Feb 2025 06:13  Mg     2.1     02-26    TPro  6.2  /  Alb  3.6  /  TBili  0.3  /  DBili  x   /  AST  18  /  ALT  17  /  AlkPhos  73  02-26    ABG - ( 25 Feb 2025 11:42 )  pH, Arterial: 7.49  pH, Blood: x     /  pCO2: 51    /  pO2: 53    / HCO3: 39    / Base Excess: 13.4  /  SaO2: 89.5              Urinalysis Basic - ( 26 Feb 2025 06:13 )    Color: x / Appearance: x / SG: x / pH: x  Gluc: 84 mg/dL / Ketone: x  / Bili: x / Urobili: x   Blood: x / Protein: x / Nitrite: x   Leuk Esterase: x / RBC: x / WBC x   Sq Epi: x / Non Sq Epi: x / Bacteria: x        Lactate Trend  02-22 @ 04:31 Lactate:0.9         CAPILLARY BLOOD GLUCOSE              **************************Active Medications *******************************************  penicillins (Unknown)  sulfa drugs (Unknown)      albuterol/ipratropium for Nebulization 3 milliLiter(s) Nebulizer every 6 hours  bisacodyl 5 milliGRAM(s) Oral every 12 hours PRN  chlorhexidine 2% Cloths 1 Application(s) Topical daily  enoxaparin Injectable 30 milliGRAM(s) SubCutaneous every 24 hours  fluticasone propionate/ salmeterol 500-50 MICROgram(s) Diskus 1 Dose(s) Inhalation two times a day  pantoprazole  Injectable 40 milliGRAM(s) IV Push daily  predniSONE   Tablet 40 milliGRAM(s) Oral daily  tiotropium 2.5 MICROgram(s) Inhaler 2 Puff(s) Inhalation daily      ***************************************************  RADIOLOGY & ADDITIONAL TESTS:    Imaging Personally Reviewed:  [ ] YES  [ ] NO    HEALTH ISSUES - PROBLEM Dx:

## 2025-02-26 NOTE — PHYSICAL THERAPY INITIAL EVALUATION ADULT - GENERAL OBSERVATIONS, REHAB EVAL
9:28-9:58. Pt encountered semifowler in bed in NAD,+ O2 2 lpm via NC, SPO2 at rest on 2 L 87%,  no complaints, agreeable to PT.

## 2025-02-26 NOTE — PHYSICAL THERAPY INITIAL EVALUATION ADULT - GAIT DISTANCE, PT EVAL
pt marched in place 5 reps then 4' bed to chair, O2 2 lpm 87% SPO2, labored breathing, Nida RN notified and aware

## 2025-02-26 NOTE — PHYSICAL THERAPY INITIAL EVALUATION ADULT - NSACTIVITYREC_GEN_A_PT
Reviewed ther ex's for B LE to perform throughout day in sitting: hip flex, knee flex/ext, ankle pumps, 10 reps each. Reviewed ther ex's for B LE to perform throughout day in sitting: hip flex, knee flex/ext, ankle pumps, 10 reps each. PT performed chest PT at b/s in sitting at EOB. Pt also educated pt on pursed lip breathing.

## 2025-02-26 NOTE — PHYSICAL THERAPY INITIAL EVALUATION ADULT - PERTINENT HX OF CURRENT PROBLEM, REHAB EVAL
79-year-old female admitted for hypoxia rsv. Pt with past medical history of hypertension, chronic high bicarb (COPD?/smoker?), who came from urgent care for hypoxia in the setting of nasal congestion, cough, increasing shortness of breath over the past week. Patient was initially seen in urgent care to be evaluated with negative rapid flu and COVID. Notably patient was satting 80% on room air there and improved on 3 L nasal cannula. While in ED pt was taken off room air for a little and satted in 80's and was obtunded and agitated and SOB. Lorazepam 2 mg given. BIPAP placed with improvement in SOB but sleepy.  ED interview: Patient denies any fevers, nausea/vomiting, chest pain, urinary symptoms, leg swelling. My interview: Pt not waking up to sternal rub so could not conduct interview, likely from ativan vs co2 narcosis.

## 2025-02-27 LAB
ALBUMIN SERPL ELPH-MCNC: 3.7 G/DL — SIGNIFICANT CHANGE UP (ref 3.5–5.2)
ALP SERPL-CCNC: 75 U/L — SIGNIFICANT CHANGE UP (ref 30–115)
ALT FLD-CCNC: 20 U/L — SIGNIFICANT CHANGE UP (ref 0–41)
ANION GAP SERPL CALC-SCNC: 10 MMOL/L — SIGNIFICANT CHANGE UP (ref 7–14)
AST SERPL-CCNC: 17 U/L — SIGNIFICANT CHANGE UP (ref 0–41)
BASOPHILS # BLD AUTO: 0.03 K/UL — SIGNIFICANT CHANGE UP (ref 0–0.2)
BASOPHILS NFR BLD AUTO: 0.2 % — SIGNIFICANT CHANGE UP (ref 0–1)
BILIRUB SERPL-MCNC: 0.3 MG/DL — SIGNIFICANT CHANGE UP (ref 0.2–1.2)
BUN SERPL-MCNC: 25 MG/DL — HIGH (ref 10–20)
CALCIUM SERPL-MCNC: 8.6 MG/DL — SIGNIFICANT CHANGE UP (ref 8.4–10.5)
CHLORIDE SERPL-SCNC: 97 MMOL/L — LOW (ref 98–110)
CO2 SERPL-SCNC: 34 MMOL/L — HIGH (ref 17–32)
CREAT SERPL-MCNC: 1.5 MG/DL — SIGNIFICANT CHANGE UP (ref 0.7–1.5)
EGFR: 35 ML/MIN/1.73M2 — LOW
EGFR: 35 ML/MIN/1.73M2 — LOW
EOSINOPHIL # BLD AUTO: 0.36 K/UL — SIGNIFICANT CHANGE UP (ref 0–0.7)
EOSINOPHIL NFR BLD AUTO: 2.4 % — SIGNIFICANT CHANGE UP (ref 0–8)
GLUCOSE SERPL-MCNC: 89 MG/DL — SIGNIFICANT CHANGE UP (ref 70–99)
HCT VFR BLD CALC: 42.4 % — SIGNIFICANT CHANGE UP (ref 37–47)
HGB BLD-MCNC: 12.9 G/DL — SIGNIFICANT CHANGE UP (ref 12–16)
IMM GRANULOCYTES NFR BLD AUTO: 1 % — HIGH (ref 0.1–0.3)
LYMPHOCYTES # BLD AUTO: 1.32 K/UL — SIGNIFICANT CHANGE UP (ref 1.2–3.4)
LYMPHOCYTES # BLD AUTO: 8.9 % — LOW (ref 20.5–51.1)
MAGNESIUM SERPL-MCNC: 1.9 MG/DL — SIGNIFICANT CHANGE UP (ref 1.8–2.4)
MCHC RBC-ENTMCNC: 27 PG — SIGNIFICANT CHANGE UP (ref 27–31)
MCHC RBC-ENTMCNC: 30.4 G/DL — LOW (ref 32–37)
MCV RBC AUTO: 88.9 FL — SIGNIFICANT CHANGE UP (ref 81–99)
MONOCYTES # BLD AUTO: 1.13 K/UL — HIGH (ref 0.1–0.6)
MONOCYTES NFR BLD AUTO: 7.6 % — SIGNIFICANT CHANGE UP (ref 1.7–9.3)
NEUTROPHILS # BLD AUTO: 11.81 K/UL — HIGH (ref 1.4–6.5)
NEUTROPHILS NFR BLD AUTO: 79.9 % — HIGH (ref 42.2–75.2)
NRBC BLD AUTO-RTO: 0 /100 WBCS — SIGNIFICANT CHANGE UP (ref 0–0)
PLATELET # BLD AUTO: 272 K/UL — SIGNIFICANT CHANGE UP (ref 130–400)
PMV BLD: 9.5 FL — SIGNIFICANT CHANGE UP (ref 7.4–10.4)
POTASSIUM SERPL-MCNC: 4.3 MMOL/L — SIGNIFICANT CHANGE UP (ref 3.5–5)
POTASSIUM SERPL-SCNC: 4.3 MMOL/L — SIGNIFICANT CHANGE UP (ref 3.5–5)
PROT SERPL-MCNC: 6.3 G/DL — SIGNIFICANT CHANGE UP (ref 6–8)
RBC # BLD: 4.77 M/UL — SIGNIFICANT CHANGE UP (ref 4.2–5.4)
RBC # FLD: 13.4 % — SIGNIFICANT CHANGE UP (ref 11.5–14.5)
SODIUM SERPL-SCNC: 141 MMOL/L — SIGNIFICANT CHANGE UP (ref 135–146)
WBC # BLD: 14.8 K/UL — HIGH (ref 4.8–10.8)
WBC # FLD AUTO: 14.8 K/UL — HIGH (ref 4.8–10.8)

## 2025-02-27 PROCEDURE — 71045 X-RAY EXAM CHEST 1 VIEW: CPT | Mod: 26

## 2025-02-27 PROCEDURE — 99233 SBSQ HOSP IP/OBS HIGH 50: CPT

## 2025-02-27 RX ORDER — PREDNISONE 20 MG/1
30 TABLET ORAL DAILY
Refills: 0 | Status: COMPLETED | OUTPATIENT
Start: 2025-02-28 | End: 2025-03-03

## 2025-02-27 RX ORDER — PREDNISONE 20 MG/1
10 TABLET ORAL DAILY
Refills: 0 | Status: CANCELLED | OUTPATIENT
Start: 2025-03-08 | End: 2025-03-03

## 2025-02-27 RX ORDER — PREDNISONE 20 MG/1
20 TABLET ORAL DAILY
Refills: 0 | Status: DISCONTINUED | OUTPATIENT
Start: 2025-03-04 | End: 2025-03-03

## 2025-02-27 RX ADMIN — Medication 1 DOSE(S): at 19:42

## 2025-02-27 RX ADMIN — IPRATROPIUM BROMIDE AND ALBUTEROL SULFATE 3 MILLILITER(S): .5; 2.5 SOLUTION RESPIRATORY (INHALATION) at 13:31

## 2025-02-27 RX ADMIN — Medication 40 MILLIGRAM(S): at 11:13

## 2025-02-27 RX ADMIN — PREDNISONE 40 MILLIGRAM(S): 20 TABLET ORAL at 05:14

## 2025-02-27 RX ADMIN — Medication 1 APPLICATION(S): at 11:16

## 2025-02-27 RX ADMIN — IPRATROPIUM BROMIDE AND ALBUTEROL SULFATE 3 MILLILITER(S): .5; 2.5 SOLUTION RESPIRATORY (INHALATION) at 07:54

## 2025-02-27 RX ADMIN — TIOTROPIUM BROMIDE INHALATION SPRAY 2 PUFF(S): 3.12 SPRAY, METERED RESPIRATORY (INHALATION) at 07:53

## 2025-02-27 RX ADMIN — IPRATROPIUM BROMIDE AND ALBUTEROL SULFATE 3 MILLILITER(S): .5; 2.5 SOLUTION RESPIRATORY (INHALATION) at 20:26

## 2025-02-27 RX ADMIN — ENOXAPARIN SODIUM 30 MILLIGRAM(S): 100 INJECTION SUBCUTANEOUS at 10:35

## 2025-02-27 RX ADMIN — IPRATROPIUM BROMIDE AND ALBUTEROL SULFATE 3 MILLILITER(S): .5; 2.5 SOLUTION RESPIRATORY (INHALATION) at 01:58

## 2025-02-27 NOTE — PROGRESS NOTE ADULT - SUBJECTIVE AND OBJECTIVE BOX
SAMMIE JESSICA  [facility] 005 B ([facility])            Patient was evaluated and examined  by bedside,                 REVIEW OF SYSTEMS:  please see pertinent positives mentioned above, all other 12 ROS negative      T(C): , Max: 37.2 (02-27-25 @ 05:00)  HR: 104 (02-27-25 @ 05:00)  BP: 124/68 (02-27-25 @ 05:00)  RR: 17 (02-27-25 @ 05:00)  SpO2: 85% (02-27-25 @ 11:26)  CAPILLARY BLOOD GLUCOSE          PHYSICAL EXAM:  General: chronically ill-appearing  HEENT: NCAT  Lungs: No increased WOB  CVS: RRR  Abdomen: , non-distended  Extremities: no edema        LAB  CBC  Date: 02-27-25 @ 06:30  Mean cell Vpaevxljyp74.0  Mean cell Hemoglobin Conc30.4  Mean cell Volum 88.9  Platelet count-Automate 272  RBC Count 4.77  Red Cell Distrib Width13.4  WBC Count14.80  % Albumin, Urine--  Hematocrit 42.4  Hemoglobin 12.9  CBC  Date: 02-26-25 @ 06:13  Mean cell Nprpthqrjq89.0  Mean cell Hemoglobin Conc30.3  Mean cell Volum 89.1  Platelet count-Automate 266  RBC Count 4.59  Red Cell Distrib Width13.4  WBC Count12.38  % Albumin, Urine--  Hematocrit 40.9  Hemoglobin 12.4  CBC  Date: 02-25-25 @ 04:56  Mean cell Xwaipydaia10.7  Mean cell Hemoglobin Conc30.5  Mean cell Volum 87.6  Platelet count-Automate 217  RBC Count 4.50  Red Cell Distrib Width13.3  WBC Count11.06  % Albumin, Urine--  Hematocrit 39.4  Hemoglobin 12.0  CBC  Date: 02-24-25 @ 04:57  Mean cell Epbldtvtkn85.7  Mean cell Hemoglobin Conc29.9  Mean cell Volum 89.3  Platelet count-Automate 210  RBC Count 4.49  Red Cell Distrib Width13.2  WBC Count10.94  % Albumin, Urine--  Hematocrit 40.1  Hemoglobin 12.0  CBC  Date: 02-23-25 @ 04:30  Mean cell Wteifbaerd08.0  Mean cell Hemoglobin Conc29.4  Mean cell Volum 91.7  Platelet count-Automate 186  RBC Count 4.23  Red Cell Distrib Width13.2  WBC Count8.87  % Albumin, Urine--  Hematocrit 38.8  Hemoglobin 11.4  CBC  Date: 02-22-25 @ 04:31  Mean cell Oemlekmanz70.8  Mean cell Hemoglobin Conc29.5  Mean cell Volum 90.8  Platelet count-Automate 192  RBC Count 4.36  Red Cell Distrib Width13.0  WBC Count7.56  % Albumin, Urine--  Hematocrit 39.6  Hemoglobin 11.7  CBC  Date: 02-21-25 @ 11:36  Mean cell Fnnvmrcylf40.8  Mean cell Hemoglobin Conc29.3  Mean cell Volum 91.3  Platelet count-Automate 195  RBC Count 4.48  Red Cell Distrib Width13.0  WBC Count8.27  % Albumin, Urine--  Hematocrit 40.9  Hemoglobin 12.0  CBC  Date: 02-20-25 @ 21:34  Mean cell Lzgzzihkpt11.1  Mean cell Hemoglobin Conc30.0  Mean cell Volum 90.2  Platelet count-Automate 198  RBC Count 4.80  Red Cell Distrib Width13.1  WBC Count10.70  % Albumin, Urine--  Hematocrit 43.3  Hemoglobin 13.0    [facility]  02-27-25 @ 06:30  Blood Gas Arterial-Calcium,Ionized--  Blood Urea Nitrogen, Serum 25 mg/dL[H] [10 - 20]  Carbon Dioxide, Serum34 mmol/L[H] [17 - 32]  Chloride, Serum97 mmol/L[L] [98 - 110]  Creatinie, Serum1.5 mg/dL [0.7 - 1.5]  Glucose, Serum89 mg/dL [70 - 99]  Potassium, Serum4.3 mmol/L [3.5 - 5.0]  Sodium, Serum 141 mmol/L [135 - 146]  [facility]  02-26-25 @ 06:13  Blood Gas Arterial-Calcium,Ionized--  Blood Urea Nitrogen, Serum 30 mg/dL[H] [10 - 20]  Carbon Dioxide, Serum35 mmol/L[H] [17 - 32]  Chloride, Serum96 mmol/L[L] [98 - 110]  Creatinie, Serum1.3 mg/dL [0.7 - 1.5]  Glucose, Serum84 mg/dL [70 - 99]  Potassium, Serum4.5 mmol/L [3.5 - 5.0]  Sodium, Serum 140 mmol/L [135 - 146]  [facility]  02-25-25 @ 04:56  Blood Gas Arterial-Calcium,Ionized--  Blood Urea Nitrogen, Serum 33 mg/dL[H] [10 - 20]  Carbon Dioxide, Serum36 mmol/L[H] [17 - 32]  Chloride, Serum98 mmol/L [98 - 110]  Creatinie, Serum1.2 mg/dL [0.7 - 1.5]  Glucose, Serum89 mg/dL [70 - 99]  Potassium, Serum4.5 mmol/L [3.5 - 5.0]  Sodium, Serum 140 mmol/L [135 - 146]  [facility]  02-24-25 @ 04:57  Blood Gas Arterial-Calcium,Ionized--  Blood Urea Nitrogen, Serum 36 mg/dL[H] [10 - 20]  Carbon Dioxide, Serum34 mmol/L[H] [17 - 32]  Chloride, Serum98 mmol/L [98 - 110]  Creatinie, Serum1.3 mg/dL [0.7 - 1.5]  Glucose, Okadc886 mg/dL[H] [70 - 99]  Potassium, Serum5.6 mmol/L[H] [3.5 - 5.0]  Sodium, Serum 139 mmol/L [135 - 146]  [facility]  02-23-25 @ 04:30  Blood Gas Arterial-Calcium,Ionized--  Blood Urea Nitrogen, Serum 42 mg/dL[H] [10 - 20]  Carbon Dioxide, Serum32 mmol/L [17 - 32]  Chloride, Ffswd613 mmol/L [98 - 110]  Creatinie, Serum1.2 mg/dL [0.7 - 1.5]  Glucose, Qhvoh099 mg/dL[H] [70 - 99]  Potassium, Serum4.9 mmol/L [3.5 - 5.0]  Sodium, Serum 142 mmol/L [135 - 146]  [facility]  02-22-25 @ 16:27  Blood Gas Arterial-Calcium,Ionized--  Blood Urea Nitrogen, Serum 42 mg/dL[H] [10 - 20]  Carbon Dioxide, Serum28 mmol/L [17 - 32]  Chloride, Serum98 mmol/L [98 - 110]  Creatinie, Serum1.4 mg/dL [0.7 - 1.5]  Glucose, Qdroh547 mg/dL[H] [70 - 99]  Potassium, Serum5.2 mmol/L[H] [3.5 - 5.0] [Slighty Hemolyzed use with Caution]  Sodium, Serum 140 mmol/L [135 - 146]              Microbiology:    Culture - Blood (collected 02-20-25 @ 21:34)  Source: .Blood Blood-Peripheral  Final Report (02-26-25 @ 09:00):    No growth at 5 days    Culture - Blood (collected 02-20-25 @ 21:34)  Source: .Blood Blood-Peripheral  Final Report (02-26-25 @ 09:00):    No growth at 5 days        RADIOLOGY & ADDITIONAL TESTS:        Medications:  albuterol/ipratropium for Nebulization 3 milliLiter(s) Nebulizer every 6 hours  bisacodyl 5 milliGRAM(s) Oral every 12 hours PRN  chlorhexidine 2% Cloths 1 Application(s) Topical daily  enoxaparin Injectable 30 milliGRAM(s) SubCutaneous every 24 hours  fluticasone propionate/ salmeterol 500-50 MICROgram(s) Diskus 1 Dose(s) Inhalation two times a day  pantoprazole  Injectable 40 milliGRAM(s) IV Push daily  predniSONE   Tablet 40 milliGRAM(s) Oral daily  tiotropium 2.5 MICROgram(s) Inhaler 2 Puff(s) Inhalation daily        Assessment and Plan:    79-year-old female with past medical history of hypertension, chronic high bicarb (COPD?/smoker?), who came from urgent care for hypoxia in the setting of nasal congestion, cough, increasing shortness of breath over the past week. Patient was initially seen in urgent care to be evaluated with negative rapid flu and COVID. Notably patient was satting 80% on room air there and improved on 3 L nasal cannula. While in ED pt was taken off room air for a little and satted in 80's and was obtunded and agitated and SOB. Lorazepam 2 mg given. BIPAP placed with improvement in SOB but sleepy.  ED interview: Patient denies any fevers, nausea/vomiting, chest pain, urinary symptoms, leg swelling. My interview: Pt not waking up to sternal rub so could not conduct interview, likely from ativan vs co2 narcosis.      # Altered Mental Status likely due to toxic vs Metabolic encephalopathy, resolving   # Acute on chronic hypercapnic respiratory failure    possibly due to undiagnosed COPD exacerbation from RSV   #septic shock likely 2/2 viral infection   #hx of tobacco use  - Xray Chest (02.21.25) No radiographic evidence of acute cardiopulmonary disease.  - CT Angio Chest PE Protocol w/ IV Cont (02.20.25) No evidence of acute pulmonary embolism or acute thoracic pathology.  - CT Head No Cont (02.20.25): Partially motion degraded study. No definite evidence of acute intracranial pathology.  - s/p solumedrol 40mg q12h > now on prednisone.   - c/w Duoneb  - MRSA negative   - BCx negative   - on d/c will need ICS/LABA, LAMA, PRN albuterol  - outpatient pulmonary f/u after d/c  - repeat CXR 2-3 months   - cw Prednisone taper, 40 mg 2/23 and taper 10 mg Q4d  - Advair 500 and Spiriva  - O2 via NC to keep spo2 more than 88%  - repeat ABG shows improved PCo2  - AVAP on DC: settings per pulm: EPAP 8 IPAP 14-20 goal  Backup Rate 12  - wean O2 as juancho/  f/u ambulatory O2 > might need Home O2     #hx HTN   - hold home losartan 100mg daily iso shock + levo/ low BP    - hold home amlodipine 5mg daily   - monitor BP     #CKD  - Cr 1.6 (around baseline)   - monitor I&O  - monitor renal function, electrolytes    #?hx HFpEF  - TTE: EF of 61%. Mild (grade 1) diastolic dysfunction  - no volume overload   - trop 35 > 25     #MISC  DVT ppx: Lovenox   GI ppx: PPI   Diet: DASH  Activity: AAT     #Pending: AVAP setup/ ? Home O2/ PT    Dispo: TBD     Plan d/w pt and the family at bedside.          SAMMIE JESSICA  [facility] 005 B ([facility])            Patient was evaluated and examined  by bedside,                 REVIEW OF SYSTEMS:  please see pertinent positives mentioned above, all other 12 ROS negative      T(C): , Max: 37.2 (02-27-25 @ 05:00)  HR: 104 (02-27-25 @ 05:00)  BP: 124/68 (02-27-25 @ 05:00)  RR: 17 (02-27-25 @ 05:00)  SpO2: 85% (02-27-25 @ 11:26)  CAPILLARY BLOOD GLUCOSE          PHYSICAL EXAM:  General: chronically ill-appearing  HEENT: NCAT  Lungs: No increased WOB  CVS: RRR  Abdomen: , non-distended  Extremities: no edema        LAB  CBC  Date: 02-27-25 @ 06:30  Mean cell Jicfrathma52.0  Mean cell Hemoglobin Conc30.4  Mean cell Volum 88.9  Platelet count-Automate 272  RBC Count 4.77  Red Cell Distrib Width13.4  WBC Count14.80  % Albumin, Urine--  Hematocrit 42.4  Hemoglobin 12.9  CBC  Date: 02-26-25 @ 06:13  Mean cell Tkcgaxeacl89.0  Mean cell Hemoglobin Conc30.3  Mean cell Volum 89.1  Platelet count-Automate 266  RBC Count 4.59  Red Cell Distrib Width13.4  WBC Count12.38  % Albumin, Urine--  Hematocrit 40.9  Hemoglobin 12.4  CBC  Date: 02-25-25 @ 04:56  Mean cell Sgdoxfnldf62.7  Mean cell Hemoglobin Conc30.5  Mean cell Volum 87.6  Platelet count-Automate 217  RBC Count 4.50  Red Cell Distrib Width13.3  WBC Count11.06  % Albumin, Urine--  Hematocrit 39.4  Hemoglobin 12.0  CBC  Date: 02-24-25 @ 04:57  Mean cell Ivwipkbmoh37.7  Mean cell Hemoglobin Conc29.9  Mean cell Volum 89.3  Platelet count-Automate 210  RBC Count 4.49  Red Cell Distrib Width13.2  WBC Count10.94  % Albumin, Urine--  Hematocrit 40.1  Hemoglobin 12.0  CBC  Date: 02-23-25 @ 04:30  Mean cell Aatrcglgwk32.0  Mean cell Hemoglobin Conc29.4  Mean cell Volum 91.7  Platelet count-Automate 186  RBC Count 4.23  Red Cell Distrib Width13.2  WBC Count8.87  % Albumin, Urine--  Hematocrit 38.8  Hemoglobin 11.4  CBC  Date: 02-22-25 @ 04:31  Mean cell Vwiapsuseb32.8  Mean cell Hemoglobin Conc29.5  Mean cell Volum 90.8  Platelet count-Automate 192  RBC Count 4.36  Red Cell Distrib Width13.0  WBC Count7.56  % Albumin, Urine--  Hematocrit 39.6  Hemoglobin 11.7  CBC  Date: 02-21-25 @ 11:36  Mean cell Xinoxgobtu51.8  Mean cell Hemoglobin Conc29.3  Mean cell Volum 91.3  Platelet count-Automate 195  RBC Count 4.48  Red Cell Distrib Width13.0  WBC Count8.27  % Albumin, Urine--  Hematocrit 40.9  Hemoglobin 12.0  CBC  Date: 02-20-25 @ 21:34  Mean cell Yjembiwbol39.1  Mean cell Hemoglobin Conc30.0  Mean cell Volum 90.2  Platelet count-Automate 198  RBC Count 4.80  Red Cell Distrib Width13.1  WBC Count10.70  % Albumin, Urine--  Hematocrit 43.3  Hemoglobin 13.0    [facility]  02-27-25 @ 06:30  Blood Gas Arterial-Calcium,Ionized--  Blood Urea Nitrogen, Serum 25 mg/dL[H] [10 - 20]  Carbon Dioxide, Serum34 mmol/L[H] [17 - 32]  Chloride, Serum97 mmol/L[L] [98 - 110]  Creatinie, Serum1.5 mg/dL [0.7 - 1.5]  Glucose, Serum89 mg/dL [70 - 99]  Potassium, Serum4.3 mmol/L [3.5 - 5.0]  Sodium, Serum 141 mmol/L [135 - 146]  [facility]  02-26-25 @ 06:13  Blood Gas Arterial-Calcium,Ionized--  Blood Urea Nitrogen, Serum 30 mg/dL[H] [10 - 20]  Carbon Dioxide, Serum35 mmol/L[H] [17 - 32]  Chloride, Serum96 mmol/L[L] [98 - 110]  Creatinie, Serum1.3 mg/dL [0.7 - 1.5]  Glucose, Serum84 mg/dL [70 - 99]  Potassium, Serum4.5 mmol/L [3.5 - 5.0]  Sodium, Serum 140 mmol/L [135 - 146]  [facility]  02-25-25 @ 04:56  Blood Gas Arterial-Calcium,Ionized--  Blood Urea Nitrogen, Serum 33 mg/dL[H] [10 - 20]  Carbon Dioxide, Serum36 mmol/L[H] [17 - 32]  Chloride, Serum98 mmol/L [98 - 110]  Creatinie, Serum1.2 mg/dL [0.7 - 1.5]  Glucose, Serum89 mg/dL [70 - 99]  Potassium, Serum4.5 mmol/L [3.5 - 5.0]  Sodium, Serum 140 mmol/L [135 - 146]  [facility]  02-24-25 @ 04:57  Blood Gas Arterial-Calcium,Ionized--  Blood Urea Nitrogen, Serum 36 mg/dL[H] [10 - 20]  Carbon Dioxide, Serum34 mmol/L[H] [17 - 32]  Chloride, Serum98 mmol/L [98 - 110]  Creatinie, Serum1.3 mg/dL [0.7 - 1.5]  Glucose, Johko831 mg/dL[H] [70 - 99]  Potassium, Serum5.6 mmol/L[H] [3.5 - 5.0]  Sodium, Serum 139 mmol/L [135 - 146]  [facility]  02-23-25 @ 04:30  Blood Gas Arterial-Calcium,Ionized--  Blood Urea Nitrogen, Serum 42 mg/dL[H] [10 - 20]  Carbon Dioxide, Serum32 mmol/L [17 - 32]  Chloride, Gxzkg422 mmol/L [98 - 110]  Creatinie, Serum1.2 mg/dL [0.7 - 1.5]  Glucose, Gfevw920 mg/dL[H] [70 - 99]  Potassium, Serum4.9 mmol/L [3.5 - 5.0]  Sodium, Serum 142 mmol/L [135 - 146]  [facility]  02-22-25 @ 16:27  Blood Gas Arterial-Calcium,Ionized--  Blood Urea Nitrogen, Serum 42 mg/dL[H] [10 - 20]  Carbon Dioxide, Serum28 mmol/L [17 - 32]  Chloride, Serum98 mmol/L [98 - 110]  Creatinie, Serum1.4 mg/dL [0.7 - 1.5]  Glucose, Lhmlx078 mg/dL[H] [70 - 99]  Potassium, Serum5.2 mmol/L[H] [3.5 - 5.0] [Slighty Hemolyzed use with Caution]  Sodium, Serum 140 mmol/L [135 - 146]              Microbiology:    Culture - Blood (collected 02-20-25 @ 21:34)  Source: .Blood Blood-Peripheral  Final Report (02-26-25 @ 09:00):    No growth at 5 days    Culture - Blood (collected 02-20-25 @ 21:34)  Source: .Blood Blood-Peripheral  Final Report (02-26-25 @ 09:00):    No growth at 5 days        RADIOLOGY & ADDITIONAL TESTS:        Medications:  albuterol/ipratropium for Nebulization 3 milliLiter(s) Nebulizer every 6 hours  bisacodyl 5 milliGRAM(s) Oral every 12 hours PRN  chlorhexidine 2% Cloths 1 Application(s) Topical daily  enoxaparin Injectable 30 milliGRAM(s) SubCutaneous every 24 hours  fluticasone propionate/ salmeterol 500-50 MICROgram(s) Diskus 1 Dose(s) Inhalation two times a day  pantoprazole  Injectable 40 milliGRAM(s) IV Push daily  predniSONE   Tablet 40 milliGRAM(s) Oral daily  tiotropium 2.5 MICROgram(s) Inhaler 2 Puff(s) Inhalation daily        Assessment and Plan:    79-year-old female with past medical history of hypertension, chronic high bicarb (COPD?/smoker?), who came from urgent care for hypoxia in the setting of nasal congestion, cough, increasing shortness of breath over the past week. Patient was initially seen in urgent care to be evaluated with negative rapid flu and COVID. Notably patient was satting 80% on room air there and improved on 3 L nasal cannula. While in ED pt was taken off room air for a little and satted in 80's and was obtunded and agitated and SOB. Lorazepam 2 mg given. BIPAP placed with improvement in SOB but sleepy.  ED interview: Patient denies any fevers, nausea/vomiting, chest pain, urinary symptoms, leg swelling. My interview: Pt not waking up to sternal rub so could not conduct interview, likely from ativan vs co2 narcosis.      # Altered Mental Status likely due to toxic vs Metabolic encephalopathy, resolving   # Acute on chronic hypercapnic respiratory failure    possibly due to undiagnosed COPD exacerbation from RSV   #septic shock likely 2/2 viral infection   #hx of tobacco use  - Xray Chest (02.21.25) No radiographic evidence of acute cardiopulmonary disease.  - CT Angio Chest PE Protocol w/ IV Cont (02.20.25) No evidence of acute pulmonary embolism or acute thoracic pathology.  - CT Head No Cont (02.20.25): Partially motion degraded study. No definite evidence of acute intracranial pathology.  - s/p solumedrol 40mg q12h > now on prednisone.   - c/w Duoneb  - MRSA negative   - BCx negative   - on d/c will need ICS/LABA, LAMA, PRN albuterol  - outpatient pulmonary f/u after d/c  - repeat CXR 2-3 months   - cw Prednisone taper, 40 mg 2/23 and taper 10 mg Q4d  - Advair 500 and Spiriva  - O2 via NC to keep spo2 more than 88%  - repeat ABG shows improved PCo2  - AVAP on DC: settings per pulm: EPAP 8 IPAP 14-20 goal  Backup Rate 12  - wean O2 as juancho/  f/u ambulatory O2 > might need Home O2     #hx HTN   - hold home losartan 100mg daily iso shock + levo/ low BP    - hold home amlodipine 5mg daily   - monitor BP     #CKD Stage 3  - Cr 1.6, GFR in the low 30s (around baseline)   - monitor I&O  - monitor renal function, electrolytes    #?hx HFpEF  - TTE: EF of 61%. Mild (grade 1) diastolic dysfunction  - no volume overload   - trop 35 > 25     #MISC  DVT ppx: Lovenox   GI ppx: PPI   Diet: DASH  Activity: AAT     #Pending: AVAP setup/ ? Home O2/ PT    Dispo: TBD     Plan d/w pt and the family at bedside.

## 2025-02-27 NOTE — PROGRESS NOTE ADULT - SUBJECTIVE AND OBJECTIVE BOX
SUBJECTIVE/OVERNIGHT EVENTS  Today is hospital day 6d. This morning patient was seen and examined at bedside, resting comfortably in bed. Pt used BiPAP overnight, still requiring NC at rest.     MEDICATIONS  STANDING MEDICATIONS  albuterol/ipratropium for Nebulization 3 milliLiter(s) Nebulizer every 6 hours  chlorhexidine 2% Cloths 1 Application(s) Topical daily  enoxaparin Injectable 30 milliGRAM(s) SubCutaneous every 24 hours  fluticasone propionate/ salmeterol 500-50 MICROgram(s) Diskus 1 Dose(s) Inhalation two times a day  pantoprazole  Injectable 40 milliGRAM(s) IV Push daily  predniSONE   Tablet 40 milliGRAM(s) Oral daily  tiotropium 2.5 MICROgram(s) Inhaler 2 Puff(s) Inhalation daily    PRN MEDICATIONS  bisacodyl 5 milliGRAM(s) Oral every 12 hours PRN    VITALS  T(F): 98.9 (02-27-25 @ 05:00), Max: 98.9 (02-27-25 @ 05:00)  HR: 104 (02-27-25 @ 05:00) (96 - 104)  BP: 124/68 (02-27-25 @ 05:00) (112/64 - 124/68)  RR: 17 (02-27-25 @ 05:00) (17 - 18)  SpO2: 85% (02-27-25 @ 11:26) (85% - 95%)    PHYSICAL EXAM  GENERAL: NAD, lying in bed comfortably  HEAD:  Atraumatic, normocephalic  EYES: EOMI, conjunctiva and sclera clear  NECK: Supple, no JVD  HEART: Regular rate and rhythm, no murmurs  LUNGS: Unlabored respirations. Mild wheezing B/L, on NC  ABDOMEN: Soft, nontender, nondistended, +BS  EXTREMITIES: 2+ peripheral pulses bilaterally. No clubbing, cyanosis, or edema  NERVOUS SYSTEM:  A&Ox3, no focal deficits   SKIN: No rashes or lesions    LABS             12.9   14.80 )-----------( 272      ( 02-27-25 @ 06:30 )             42.4     141  |  97  |  25  -------------------------<  89   02-27-25 @ 06:30  4.3  |  34  |  1.5    Ca      8.6     02-27-25 @ 06:30  Mg     1.9     02-27-25 @ 06:30    TPro  6.3  /  Alb  3.7  /  TBili  0.3  /  DBili  x   /  AST  17  /  ALT  20  /  AlkPhos  75  /  GGT  x     02-27-25 @ 06:30        Urinalysis Basic - ( 27 Feb 2025 06:30 )    Color: x / Appearance: x / SG: x / pH: x  Gluc: 89 mg/dL / Ketone: x  / Bili: x / Urobili: x   Blood: x / Protein: x / Nitrite: x   Leuk Esterase: x / RBC: x / WBC x   Sq Epi: x / Non Sq Epi: x / Bacteria: x          IMAGING

## 2025-02-27 NOTE — CHART NOTE - NSCHARTNOTEFT_GEN_A_CORE
Josh Wendy's O2 sat is 84% on room air at rest. Patient's O2 sat is 80% on room air during ambulation. Patient's O2 sat improves to 88% with 3 LPM O2 nasal cannula. Patient is requiring home oxygen as a complication of her COPD. Julius Yi's O2 sat is 84% on room air at rest. Patient's O2 sat is 85% on room air during ambulation. Patient's O2 sat improves to 88% with 3 LPM O2 nasal cannula. Patient is requiring home oxygen as a complication of her COPD.    Pt also requiring AVAPS on dishcarge. AVAP settings: EPAP 8 IPAP 14-20 goal  Backup Rate 12    Dx: COPD    Patient will require home O2 for discharge.  Patient is aware and agreeable to home O2.    Patient is in a chronic stable state of COPD. Ms. Yi was admitted with chronic respiratory failure secondary to COPD. On this admission she was retaining dangerously high CO2 levels of 118mmhg. She was placed on a bipap and st settings of 20/6cm h20 FIO2 50%. She continued to retain dangerously high CO2 levels of 83mmhg. She will require AVAP for home on discharge. A BiPAP will be insufficient for this patient. Due to her persistent hypercapnia, the AVAP will provide adequate continuous ventilation to keep the patient's hypercapnia under control. This NIV will provide longer expiratory time to reduce the effects of flow limitation and air trapping. This will decrease work of breathing, decrease the amount of CO2 and increase oxygenation. Without this Ms. Reddy will clinically decline and will cause readmission. She requires augmented volume noninvasive ventilation not found on standard BiPAP. Ms. Yi will need this for safe discharge. Please expedite this authorization.       Julius Yi's O2 sat is 84% on room air at rest. Patient's O2 sat is 85% on room air during ambulation. Patient's O2 sat improves to 88% with 3 LPM O2 nasal cannula. Patient is requiring home oxygen as a complication of her COPD.    Pt requiring AVAPS on dishcarge. AVAP settings: EPAP 8 IPAP 14-20 goal  Backup Rate 12    Dx: COPD    Patient will require home O2 for discharge.  Patient is aware and agreeable to home O2.    Patient is in a chronic stable state of COPD. Ms. Yi was admitted with chronic respiratory failure secondary to COPD. On this admission she was retaining dangerously high CO2 levels of 118mmhg. She was placed on a bipap and st settings of 20/6cm h20 FIO2 50% settings attached. She continued to retain dangerously high CO2 levels of 83mmhg, she was then placed on AVAP settings of EPAP 8cm, RR 14, , FiO2 40% and remained hypercapnic with PCO2 of 77mmgh. Settings and abgs attached. She will require a non invasive ventilator for home discharge. A BiPAP will be insufficient for this patient. She will require frequent durations of non invasive mechanical ventilator. Ms. Yi needs aggressive target volume guarantee, advanced alarms which will alert if she becomes disconnected and a back up battery for the portability. This is required to prevent harm and risk of exacerbation of her severe and life threatening condition.     Due to her persistent hypercapnia, the NIV will provide adequate continuous ventilation to keep the patient's hypercapnia under control. This NIV will provide longer expiratory time to reduce the effects of flow limitation and air trapping. This will decrease work of breathing, decrease the amount of CO2 and increase oxygenation. This is the only machine that proves mouthpiece ventilation.     Without this NIV Ms. Yi will clinically decline and will cause readmission. She requires augmented volume noninvasive ventilation not found on standard BiPAP. Ms. Yi will need this for safe discharge. Please expedite this authorization.       Julius Yi's O2 sat is 84% on room air at rest. Patient's O2 sat is 85% on room air during ambulation. Patient's O2 sat improves to 88% with 3 LPM O2 nasal cannula. Patient is requiring home oxygen as a complication of her COPD.    Pt requiring AVAPS on dishcarge. AVAP settings: EPAP 8 IPAP 14-20 goal  Backup Rate 12    Dx: COPD    Patient will require home O2 for discharge.  Patient is aware and agreeable to home O2.    Patient is in a chronic stable state of COPD.

## 2025-02-27 NOTE — PROGRESS NOTE ADULT - ASSESSMENT
79-year-old female with past medical history of hypertension, chronic high bicarb (COPD?/smoker?), who came from urgent care for hypoxia in the setting of nasal congestion, cough, increasing shortness of breath over the past week. Patient was initially seen in urgent care to be evaluated with negative rapid flu and COVID. Notably patient was satting 80% on room air there and improved on 3 L nasal cannula. While in ED pt was taken off room air for a little and satted in 80's and was obtunded and agitated and SOB. Lorazepam 2 mg given. BIPAP placed with improvement in SOB but sleepy.  ED interview: Patient denies any fevers, nausea/vomiting, chest pain, urinary symptoms, leg swelling. My interview: Pt not waking up to sternal rub so could not conduct interview, likely from ativan vs co2 narcosis.      #RSV positive   #acute on chronic hypercapnic respiratory failure  #septic shock 2/2 viral infection   #possible viral bronchitis  #hx of tobacco use  #undiagnosed COPD  #obtunded and agitated on admission  - Xray Chest (02.21.25) No radiographic evidence of acute cardiopulmonary disease.  - CT Angio Chest PE Protocol w/ IV Cont (02.20.25) No evidence of acute pulmonary embolism or acute thoracic pathology.  - CT Head No Cont (02.20.25): Partially motion degraded study. No definite evidence of acute intracranial pathology.  - ED vitals: trop 35, creat 1.6, bnp 2979, bicarb 31 chronic, lacate 2.8, vbg pH 7.3 to 7.1 to 7.2, pco2 from 50 to 118 to 96, RSV positive  - weaned off BiPAP   - weaned off Levo   - s/p solumedrol 40mg q12h  - c/w Duoneb  - MRSA negative   - BCx negative   - on d/c will need ICS/LABA, LAMA, PRN albuterol  - outpatient pulmonary f/u after d/c  - repeat CXR 2-3 months   - cw Prednisone taper, 40 mg 2/23 and taper 10 mg Q4d  - Advair 500 and Spiriva  - O2 via NC to keep spo2 more than 88%  - repeat ABG improved  - repeat CXR 2/27: No focal parenchymal opacities, pleural effusions, or pneumothorax.  - AVAP on DC: settings per pulm: EPAP 8 IPAP 14-20 goal  Backup Rate 12  - wean O2 as juancho  - pt satting 88 on 3L, 85 on RA  - send pt with home O2  - f/u ambulatory O2    #hx HTN   - hold home losartan 100mg daily iso shock + levo/ low BP    - hold home amlodipine 5mg daily   - monitor BP     #CKD  - Cr 1.6 (around baseline)   - monitor I&O  - monitor renal function, electrolytes    #?hx HFpEF  - TTE: EF of 61%. Mild (grade 1) diastolic dysfunction  - no volume overload   - trop 35 > 25     #MISC  DVT ppx: Lovenox   GI ppx: PPI   Diet: DASH  Activity: AAT     #PENDING  - cw Prednisone taper, 40 mg 2/23 and taper 10 mg Q4d  - Advair 500 and Spiriva  - O2 via NC to keep spo2 more than 88%  - repeat ABG improved  - AVAP on DC: settings per pulm: EPAP 8 IPAP 14-20 goal  Backup Rate 12  - wean O2 as juancho  - pt satting 88 on 3L, 85 on RA  - send pt with home O2  - f/u ambulatory O2

## 2025-02-27 NOTE — PROGRESS NOTE ADULT - SUBJECTIVE AND OBJECTIVE BOX
NEPHROLOGY FOLLOW UP NOTE    BP's fair  pt seen this AM  tolerating PO  still on 2L NC O2      PAST MEDICAL & SURGICAL HISTORY:    Allergies:  penicillins (Unknown)  sulfa drugs (Unknown)    Home Medications Reviewed    SOCIAL HISTORY:  Denies ETOH,Smoking,   FAMILY HISTORY:        REVIEW OF SYSTEMS:  All other review of systems is negative unless indicated above.    PHYSICAL EXAM:  Constitutional: NAD  HEENT: anicteric sclera, oropharynx clear, MMM  Neck: No JVD  Respiratory: decreased BS b/l  Cardiovascular: S1, S2, RRR  Gastrointestinal: BS+, soft, NT/ND  Extremities: No cyanosis or clubbing. No peripheral edema  Neurological: A/O x 3, no focal deficits  Psychiatric: Normal mood, normal affect  : No CVA tenderness. No willett.   Skin: No rashes    Hospital Medications:   MEDICATIONS  (STANDING):  albuterol/ipratropium for Nebulization 3 milliLiter(s) Nebulizer every 6 hours  chlorhexidine 2% Cloths 1 Application(s) Topical daily  enoxaparin Injectable 30 milliGRAM(s) SubCutaneous every 24 hours  fluticasone propionate/ salmeterol 500-50 MICROgram(s) Diskus 1 Dose(s) Inhalation two times a day  pantoprazole  Injectable 40 milliGRAM(s) IV Push daily  tiotropium 2.5 MICROgram(s) Inhaler 2 Puff(s) Inhalation daily        VITALS:  T(F): 98.9 (02-27-25 @ 05:00), Max: 98.9 (02-27-25 @ 05:00)  HR: 104 (02-27-25 @ 05:00)  BP: 124/68 (02-27-25 @ 05:00)  RR: 17 (02-27-25 @ 05:00)  SpO2: 85% (02-27-25 @ 11:26)  Wt(kg): --    02-25 @ 07:01  -  02-26 @ 07:00  --------------------------------------------------------  IN: 0 mL / OUT: 650 mL / NET: -650 mL    02-26 @ 07:01  -  02-27 @ 07:00  --------------------------------------------------------  IN: 0 mL / OUT: 400 mL / NET: -400 mL          LABS:  02-27    141  |  97[L]  |  25[H]  ----------------------------<  89  4.3   |  34[H]  |  1.5    Ca    8.6      27 Feb 2025 06:30  Mg     1.9     02-27    TPro  6.3  /  Alb  3.7  /  TBili  0.3  /  DBili      /  AST  17  /  ALT  20  /  AlkPhos  75  02-27                          12.9   14.80 )-----------( 272      ( 27 Feb 2025 06:30 )             42.4       Urine Studies:  Urinalysis Basic - ( 27 Feb 2025 06:30 )    Color:  / Appearance:  / SG:  / pH:   Gluc: 89 mg/dL / Ketone:   / Bili:  / Urobili:    Blood:  / Protein:  / Nitrite:    Leuk Esterase:  / RBC:  / WBC    Sq Epi:  / Non Sq Epi:  / Bacteria:           RADIOLOGY & ADDITIONAL STUDIES:

## 2025-02-27 NOTE — PROGRESS NOTE ADULT - ASSESSMENT
COSME, better  CKD stage 3   hyperkalemia, better  ARF improving  RSV / COPD exac  ex-smoker    plan:    cont off losartan  cont off amlodipine  monitor BP's  trend renal function and lytes  f/u pulm recommendations  physical therapy  possible home O2 and NIV

## 2025-02-28 ENCOUNTER — TRANSCRIPTION ENCOUNTER (OUTPATIENT)
Age: 80
End: 2025-02-28

## 2025-02-28 LAB
ALBUMIN SERPL ELPH-MCNC: 3.5 G/DL — SIGNIFICANT CHANGE UP (ref 3.5–5.2)
ALP SERPL-CCNC: 63 U/L — SIGNIFICANT CHANGE UP (ref 30–115)
ALT FLD-CCNC: 18 U/L — SIGNIFICANT CHANGE UP (ref 0–41)
ANION GAP SERPL CALC-SCNC: 11 MMOL/L — SIGNIFICANT CHANGE UP (ref 7–14)
AST SERPL-CCNC: 17 U/L — SIGNIFICANT CHANGE UP (ref 0–41)
BASOPHILS # BLD AUTO: 0.02 K/UL — SIGNIFICANT CHANGE UP (ref 0–0.2)
BASOPHILS NFR BLD AUTO: 0.1 % — SIGNIFICANT CHANGE UP (ref 0–1)
BILIRUB SERPL-MCNC: 0.3 MG/DL — SIGNIFICANT CHANGE UP (ref 0.2–1.2)
BUN SERPL-MCNC: 26 MG/DL — HIGH (ref 10–20)
CALCIUM SERPL-MCNC: 8.6 MG/DL — SIGNIFICANT CHANGE UP (ref 8.4–10.5)
CHLORIDE SERPL-SCNC: 95 MMOL/L — LOW (ref 98–110)
CO2 SERPL-SCNC: 33 MMOL/L — HIGH (ref 17–32)
CREAT SERPL-MCNC: 1.4 MG/DL — SIGNIFICANT CHANGE UP (ref 0.7–1.5)
EGFR: 38 ML/MIN/1.73M2 — LOW
EGFR: 38 ML/MIN/1.73M2 — LOW
EOSINOPHIL # BLD AUTO: 0.41 K/UL — SIGNIFICANT CHANGE UP (ref 0–0.7)
EOSINOPHIL NFR BLD AUTO: 2.8 % — SIGNIFICANT CHANGE UP (ref 0–8)
GLUCOSE SERPL-MCNC: 83 MG/DL — SIGNIFICANT CHANGE UP (ref 70–99)
HCT VFR BLD CALC: 40.5 % — SIGNIFICANT CHANGE UP (ref 37–47)
HGB BLD-MCNC: 12 G/DL — SIGNIFICANT CHANGE UP (ref 12–16)
IMM GRANULOCYTES NFR BLD AUTO: 0.6 % — HIGH (ref 0.1–0.3)
LYMPHOCYTES # BLD AUTO: 1.45 K/UL — SIGNIFICANT CHANGE UP (ref 1.2–3.4)
LYMPHOCYTES # BLD AUTO: 10 % — LOW (ref 20.5–51.1)
MAGNESIUM SERPL-MCNC: 1.9 MG/DL — SIGNIFICANT CHANGE UP (ref 1.8–2.4)
MCHC RBC-ENTMCNC: 26.5 PG — LOW (ref 27–31)
MCHC RBC-ENTMCNC: 29.6 G/DL — LOW (ref 32–37)
MCV RBC AUTO: 89.6 FL — SIGNIFICANT CHANGE UP (ref 81–99)
MONOCYTES # BLD AUTO: 1.29 K/UL — HIGH (ref 0.1–0.6)
MONOCYTES NFR BLD AUTO: 8.9 % — SIGNIFICANT CHANGE UP (ref 1.7–9.3)
NEUTROPHILS # BLD AUTO: 11.29 K/UL — HIGH (ref 1.4–6.5)
NEUTROPHILS NFR BLD AUTO: 77.6 % — HIGH (ref 42.2–75.2)
NRBC BLD AUTO-RTO: 0 /100 WBCS — SIGNIFICANT CHANGE UP (ref 0–0)
PLATELET # BLD AUTO: 273 K/UL — SIGNIFICANT CHANGE UP (ref 130–400)
PMV BLD: 9.3 FL — SIGNIFICANT CHANGE UP (ref 7.4–10.4)
POTASSIUM SERPL-MCNC: 4.3 MMOL/L — SIGNIFICANT CHANGE UP (ref 3.5–5)
POTASSIUM SERPL-SCNC: 4.3 MMOL/L — SIGNIFICANT CHANGE UP (ref 3.5–5)
PROT SERPL-MCNC: 6.2 G/DL — SIGNIFICANT CHANGE UP (ref 6–8)
RBC # BLD: 4.52 M/UL — SIGNIFICANT CHANGE UP (ref 4.2–5.4)
RBC # FLD: 13.5 % — SIGNIFICANT CHANGE UP (ref 11.5–14.5)
SODIUM SERPL-SCNC: 139 MMOL/L — SIGNIFICANT CHANGE UP (ref 135–146)
WBC # BLD: 14.55 K/UL — HIGH (ref 4.8–10.8)
WBC # FLD AUTO: 14.55 K/UL — HIGH (ref 4.8–10.8)

## 2025-02-28 PROCEDURE — 99233 SBSQ HOSP IP/OBS HIGH 50: CPT

## 2025-02-28 PROCEDURE — 71045 X-RAY EXAM CHEST 1 VIEW: CPT | Mod: 26

## 2025-02-28 RX ADMIN — Medication 1 DOSE(S): at 20:11

## 2025-02-28 RX ADMIN — IPRATROPIUM BROMIDE AND ALBUTEROL SULFATE 3 MILLILITER(S): .5; 2.5 SOLUTION RESPIRATORY (INHALATION) at 15:03

## 2025-02-28 RX ADMIN — ENOXAPARIN SODIUM 30 MILLIGRAM(S): 100 INJECTION SUBCUTANEOUS at 11:05

## 2025-02-28 RX ADMIN — Medication 1 APPLICATION(S): at 11:05

## 2025-02-28 RX ADMIN — IPRATROPIUM BROMIDE AND ALBUTEROL SULFATE 3 MILLILITER(S): .5; 2.5 SOLUTION RESPIRATORY (INHALATION) at 02:24

## 2025-02-28 RX ADMIN — IPRATROPIUM BROMIDE AND ALBUTEROL SULFATE 3 MILLILITER(S): .5; 2.5 SOLUTION RESPIRATORY (INHALATION) at 19:47

## 2025-02-28 RX ADMIN — PREDNISONE 30 MILLIGRAM(S): 20 TABLET ORAL at 05:17

## 2025-02-28 RX ADMIN — TIOTROPIUM BROMIDE INHALATION SPRAY 2 PUFF(S): 3.12 SPRAY, METERED RESPIRATORY (INHALATION) at 08:01

## 2025-02-28 RX ADMIN — IPRATROPIUM BROMIDE AND ALBUTEROL SULFATE 3 MILLILITER(S): .5; 2.5 SOLUTION RESPIRATORY (INHALATION) at 08:01

## 2025-02-28 RX ADMIN — Medication 40 MILLIGRAM(S): at 11:05

## 2025-02-28 NOTE — DISCHARGE NOTE PROVIDER - ATTENDING DISCHARGE PHYSICAL EXAMINATION:
T(F): , Max: 98.6 (02-28-25 @ 04:00)  HR: 101 (02-28-25 @ 21:31) (82 - 103)  BP: 111/71 (02-28-25 @ 21:31)  RR: 18 (02-28-25 @ 21:31)  SpO2: 94% (02-28-25 @ 21:31)  IN: 0 mL / OUT: 400 mL / NET: -400 mL      General: No apparent distress  Cardiovascular: S1, S2  Gastrointestinal: Soft, Non-tender, Non-distended  Respiratory: Good air entry bilaterally  Musculoskeletal: Moves all extremities  Lymphatic: No edema  Neurologic: No gross motor deficit  Dermatologic: Skin dry                          12.0   14.55 )-----------( 273      ( 28 Feb 2025 06:14 )             40.5     02-28    139  |  95[L]  |  26[H]  ----------------------------<  83  4.3   |  33[H]  |  1.4    Ca    8.6      28 Feb 2025 06:14  Mg     1.9     02-28    TPro  6.2  /  Alb  3.5  /  TBili  0.3  /  DBili  x   /  AST  17  /  ALT  18  /  AlkPhos  63  02-28

## 2025-02-28 NOTE — DISCHARGE NOTE NURSING/CASE MANAGEMENT/SOCIAL WORK - FINANCIAL ASSISTANCE
[facility] provides services at a reduced cost to those who are determined to be eligible through [facility]’s financial assistance program. Information regarding [facility]’s financial assistance program can be found by going to [email or web address] or by calling [phone].

## 2025-02-28 NOTE — DISCHARGE NOTE PROVIDER - CARE PROVIDER_API CALL
Floyd Robledo  Internal Medicine  [address] [place] [address], Admin - Room [address] [place] [address]  Phone: [phone]  Fax: [phone]  Follow Up Time: 1 week

## 2025-02-28 NOTE — DISCHARGE NOTE PROVIDER - NSDCCPCAREPLAN_GEN_ALL_CORE_FT
PRINCIPAL DISCHARGE DIAGNOSIS  Diagnosis: Acute respiratory failure with hypoxia and hypercapnia  Assessment and Plan of Treatment: You were admitted to the hospital for an exacerbation of COPD caused by an upper respiratory virus known as RSV. Chronic obstructive pulmonary disease (COPD) is a lung condition in which airflow from the lungs is limited. Causes include smoking, secondhand smoke exposure, genetics, or recurrent infections. Take all medicines (inhaled or pills) as directed by your health care provider. Avoid exposure to irritants such as smoke, chemicals, and fumes that aggravate your breathing.  If you are a smoker, the most important thing that you can do is stop smoking. Continuing to smoke will cause further lung damage and breathing trouble. Ask your health care provider for help with quitting smoking.  SEEK IMMEDIATE MEDICAL CARE IF YOU HAVE ANY OF THE FOLLOWING SYMPTOMS: shortness of breath at rest or when talking, bluish discoloration of lips, skin, fever, worsening cough, unexplained chest pain, or lightheadedness/dizziness. Please follow up outpatient with you primary care doctor for ongoing healthcare maintenance.         SECONDARY DISCHARGE DIAGNOSES  Diagnosis: RSV infection  Assessment and Plan of Treatment:

## 2025-02-28 NOTE — PROGRESS NOTE ADULT - ASSESSMENT
COSME, better  CKD stage 3   hyperkalemia, better  ARF improving  RSV / COPD exac  ex-smoker    plan:    cont off losartan  cont off amlodipine  monitor BP's  trend renal function and lytes  prednisone taper / mdi / nebs / ppi  physical therapy  home O2 arrangements   outpt f/u my office

## 2025-02-28 NOTE — PROGRESS NOTE ADULT - SUBJECTIVE AND OBJECTIVE BOX
NEPHROLOGY FOLLOW UP NOTE    pt seen this AM  using bedpan  on O2 NC  bp's reviewed      PAST MEDICAL & SURGICAL HISTORY:    Allergies:  penicillins (Unknown)  sulfa drugs (Unknown)    Home Medications Reviewed    SOCIAL HISTORY:  Denies ETOH,Smoking,   FAMILY HISTORY:        REVIEW OF SYSTEMS:  All other review of systems is negative unless indicated above.    PHYSICAL EXAM:  Constitutional: NAD  HEENT: anicteric sclera, oropharynx clear, MMM  Neck: No JVD  Respiratory: decreased BS b/l  Cardiovascular: S1, S2, RRR  Gastrointestinal: BS+, soft, NT/ND  Extremities: No cyanosis or clubbing. No peripheral edema  Neurological: A/O x 3, no focal deficits  Psychiatric: Normal mood, normal affect  : No CVA tenderness. No willett.   Skin: No rashes    Hospital Medications:   MEDICATIONS  (STANDING):  albuterol/ipratropium for Nebulization 3 milliLiter(s) Nebulizer every 6 hours  chlorhexidine 2% Cloths 1 Application(s) Topical daily  enoxaparin Injectable 30 milliGRAM(s) SubCutaneous every 24 hours  fluticasone propionate/ salmeterol 500-50 MICROgram(s) Diskus 1 Dose(s) Inhalation two times a day  pantoprazole  Injectable 40 milliGRAM(s) IV Push daily  predniSONE   Tablet 30 milliGRAM(s) Oral daily  tiotropium 2.5 MICROgram(s) Inhaler 2 Puff(s) Inhalation daily        VITALS:  T(F): 98.6 (02-28-25 @ 04:00), Max: 98.6 (02-28-25 @ 04:00)  HR: 82 (02-28-25 @ 04:00)  BP: 109/53 (02-28-25 @ 04:00)  RR: 18 (02-28-25 @ 04:00)  SpO2: 96% (02-28-25 @ 04:00)  Wt(kg): --    02-26 @ 07:01  -  02-27 @ 07:00  --------------------------------------------------------  IN: 0 mL / OUT: 400 mL / NET: -400 mL    02-27 @ 07:01  -  02-28 @ 07:00  --------------------------------------------------------  IN: 0 mL / OUT: 400 mL / NET: -400 mL          LABS:  02-28    139  |  95[L]  |  26[H]  ----------------------------<  83  4.3   |  33[H]  |  1.4    Ca    8.6      28 Feb 2025 06:14  Mg     1.9     02-28    TPro  6.2  /  Alb  3.5  /  TBili  0.3  /  DBili      /  AST  17  /  ALT  18  /  AlkPhos  63  02-28                          12.0   14.55 )-----------( 273      ( 28 Feb 2025 06:14 )             40.5       Urine Studies:  Urinalysis Basic - ( 28 Feb 2025 06:14 )    Color:  / Appearance:  / SG:  / pH:   Gluc: 83 mg/dL / Ketone:   / Bili:  / Urobili:    Blood:  / Protein:  / Nitrite:    Leuk Esterase:  / RBC:  / WBC    Sq Epi:  / Non Sq Epi:  / Bacteria:           RADIOLOGY & ADDITIONAL STUDIES:

## 2025-02-28 NOTE — DISCHARGE NOTE NURSING/CASE MANAGEMENT/SOCIAL WORK - NSTRANSFERBELONGINGSDISPO_GEN_A_NUR
Chief complaint:   Chief Complaint   Patient presents with   • Sleep Problem     snores, uncertain if apneas, tired, Epw 14, neck 38   • Cough     dry, hacky cough x 20 yrs, clear sputum, talking/eating makes it worse denies wheezing/       Vitals:  Visit Vitals  /70 (BP Location: RUE - Right upper extremity, Patient Position: Sitting, Cuff Size: Regular)   Pulse (!) 56   Resp 20   Ht 5' 3\" (1.6 m)   Wt 86.4 kg   SpO2 97%   BMI 33.73 kg/m²       HISTORY OF PRESENT ILLNESS     HPI  Camila Ulrich is a very pleasant 73-year-old female who presents to the clinic today along with her , Chidi, regarding consult for cough, shortness of breath, and potential for obstructive sleep apnea.    She was recently hospitalized with a non STEMI.  During her hospitalization, she was noted to have decreased oxygen saturations at night.  She does have a history of atrial fibrillation.  She had poor control over her atrial fibrillation while being hospitalized which required a cardioversion.    SLEEP:  The patient is a habitual snorer.  She has had witnessed episodes of apnea in the past.  Her  states that these are less frequent since the patient has lost weight.  The patient does wake up with a coughing sensation in the middle of the night.  She knows that she does “forget to breathe” during the day.  She has 6-7 nocturnal arousals at night.    She is going to bed around 9:30 p.m. and finds it easy to fall asleep.  When she has nocturnal arousals, she is able to return to sleep easily.  She wakes for the morning between 6:00 a.m. and 6:30 a.m. (see later in the winter) the feeling well rested.  She denies waking with headache, dry mouth or sour breath.  She feels excessively sleepy during the day and scores a 14 on the [place] Sleepiness Scale.  She does have some difficulty staying awake with driving.  She feels this may her eyes reacting to the son.  At times she has to pull over to take a nap.  She has started  taking naps since her MI.  These last for 20-45 minutes at a time.  She does have some problems with concentration and memory but “nothing out of the ordinary.”  She does have restless legs in the evening.  She does have nasal congestion and feels this may be related to allergies.  She denies somnambulism.  She rarely has somniloquy.  She does have nocturia.  Her nocturia has increased to 3-4 times a night since starting Lasix after her most recent hospitalization.    COUGH:  The patient has a 20 year history of a cough.  She has been any able to establish an etiology.  She has seen allergy in 2018 with suspicion of vocal cord dysfunction at that time.  She had recommendations to follow through with speech therapy.  The patient was unable to complete this.  She feels her cough is throughout the entire day.  It will also wake her at night at times.  She feels she does have mucus production which is clear.  She feels her mucus is worse over allergy season.  However, her cough is consistent throughout all seasons.  She denies any significant smoking history.  She denies any secondhand exposure to smoke.  She does have a health which is approximately 100 70 years old.  It has a stone base and known mold exposure.  She worked as a teacher for cell years.  She has no known exposure to toxic chemicals to her knowledge.  The patient does have a sensation to her mid throat were she feels her cough derived from.     She denies any shortness of breath at rest.  She does have increased dyspnea on exertion since her MI. She feels this is most prevalent when climbing stairs, walking in incline, and walking into the wound.  She had spirometry completed 2 years ago which did not reveal any obvious obstructive components.  The patient also had a chest x-ray performed in March of 2020. This did not reveal any significant pulmonary findings.  Lungs were clear with no pleural fluid.    The patient has had treatment in the past to help  with various other etiologies which may be provoke a cough.  She has a prescription for omeprazole which she takes daily.  She does not feel this is effective in her cough but has helped with some abdominal pain.  She does have a prescription for Flonase and nasal court but is not actively taking this.      Other significant problems:  Patient Active Problem List    Diagnosis Date Noted   • Stage 3 chronic kidney disease (CMS/[facility]) 05/21/2020     Priority: Low   • Paroxysmal atrial fibrillation (CMS/[facility]) 03/15/2020     Priority: Low   • CAD in native artery 03/12/2020     Priority: Low   • Non-allergic rhinitis (negative SPT/ID 9/3/18) 09/03/2018     Priority: Low   • Spinal stenosis of lumbar region with radiculopathy 07/18/2018     Priority: Low   • Osteoarthritis of spine with radiculopathy, lumbar region 07/18/2018     Priority: Low   • Obesity (BMI 30-39.9) 07/18/2018     Priority: Low   • Status post total bilateral knee replacement 05/16/2018     Priority: Low   • Osteopenia of multiple sites 04/19/2017     Priority: Low   • Hyperlipidemia 04/15/2015     Priority: Low   • Benign essential hypertension 04/15/2015     Priority: Low   • Acquired hypothyroidism 04/15/2015     Priority: Low       PAST MEDICAL, FAMILY AND SOCIAL HISTORY     Medications:  Current Outpatient Medications   Medication   • Omeprazole 20 MG Tablet Delayed Release Dispersible   • acetaminophen (TYLENOL) 500 MG tablet   • AMIODarone (PACERONE) 200 MG tablet   • apixaBAN (ELIQUIS) 5 MG Tab   • atorvastatin (LIPITOR) 80 MG tablet   • clopidogrel (PLAVIX) 75 MG tablet   • furosemide (LASIX) 20 MG tablet   • metoPROLOL tartrate (LOPRESSOR) 50 MG tablet   • losartan (COZAAR) 100 MG tablet   • levothyroxine (SYNTHROID, LEVOTHROID) 50 MCG tablet   • Cholecalciferol 5000 units Cap   • naphazoline-pheniramine (NAPHCON-A) 0.025-0.3 % ophthalmic solution   • nitroGLYcerin (NITROSTAT) 0.4 MG sublingual tablet   • potassium CHLORIDE (KLOR-CON M) 20 MEQ  penny ER tablet   • fluticasone (FLONASE) 50 MCG/ACT nasal spray   • triamcinolone (NASACORT) 55 MCG/ACT nasal inhaler     No current facility-administered medications for this visit.        Allergies:  ALLERGIES:   Allergen Reactions   • Simvastatin VOMITING and GI UPSET   • Mold   (Environmental) Cough     And phlegm in throat   • Seasonal Cough       Past Medical  History/Surgeries:  Past Medical History:   Diagnosis Date   • Ankle fracture     left    • Chronic cough    • Disorder of bone and cartilage, unspecified 02/10/2011   • Gastroesophageal reflux disease    • Glucose intolerance (impaired glucose tolerance)    • HLD (hyperlipidemia)    • HTN (hypertension)    • Hypothyroidism    • OA (osteoarthritis) of knee     left   • Osteopenia    • Seasonal allergies    • Wrist fracture     2014       Past Surgical History:   Procedure Laterality Date   • Ankle fracture surgery Left 1995   • Appendectomy      in childhood   • Back surgery     •  delivery+postpartum care      x2   • Colonoscopy  10/27/2006    Done at [facility] history of hyperplastic polyp   • Colonoscopy  2019    repeat in 10 years   • Dexa bone density axial skeleton  02/10/2011   • Dexa bone density axial skeleton  05/15/2014   • Pap smear,routine  01/10/2001   • Total knee arthroplasty Bilateral    • Wrist fracture surgery  2014       Family History:  Family History   Problem Relation Age of Onset   • Eczema Daughter    • Stroke Father    • Other Father         rheumatic fever   • Stomach Cancer Mother    • Diabetes Brother    • Asthma Brother    • Other Brother         milk tomato   • COPD Brother    • Cancer, Lung Sister         smoker   • Myocardial Infarction Son 37       Social History:  Social History     Tobacco Use   • Smoking status: Never Smoker   • Smokeless tobacco: Never Used   Substance Use Topics   • Alcohol use: Yes     Alcohol/week: 1.0 standard drinks     Types: 1 Glasses of wine per  week     Frequency: Monthly or less     Drinks per session: 1 or 2     Binge frequency: Never       REVIEW OF SYSTEMS     Review of Systems   Constitutional: Positive for fatigue. Negative for chills, diaphoresis, fever and unexpected weight change.   HENT: Positive for congestion.    Eyes: Negative for visual disturbance.   Respiratory: Positive for apnea, cough and shortness of breath.    Cardiovascular: Negative for chest pain and leg swelling.   Gastrointestinal: Negative for abdominal pain, diarrhea, nausea and vomiting.   Endocrine: Negative for cold intolerance and heat intolerance.   Genitourinary:        Positive for nocturia.   Musculoskeletal: Negative for arthralgias and myalgias.   Skin: Negative for color change.   Allergic/Immunologic: Positive for environmental allergies.   Neurological: Negative for light-headedness and headaches.   Hematological: Bruises/bleeds easily (on blood thinner).   Psychiatric/Behavioral: Positive for sleep disturbance.       PHYSICAL EXAM     Physical Exam  Vitals signs and nursing note reviewed.   Constitutional:       General: She is not in acute distress.     Appearance: She is well-developed. She is not diaphoretic.   HENT:      Head: Normocephalic and atraumatic.      Nose: Nose normal. No congestion or rhinorrhea.      Mouth/Throat:      Mouth: Mucous membranes are moist.      Pharynx: No oropharyngeal exudate.      Comments: Mallampati class III airway with macroglossia. Scalloped tongue. Slight post nasal drip.   Eyes:      General: No scleral icterus.     Conjunctiva/sclera: Conjunctivae normal.      Pupils: Pupils are equal, round, and reactive to light.   Neck:      Thyroid: No thyromegaly.      Vascular: No JVD.      Trachea: No tracheal deviation.      Comments: Neck circumference of 38 cm.   Cardiovascular:      Rate and Rhythm: Normal rate and regular rhythm.      Heart sounds: Normal heart sounds. No murmur. No friction rub. No gallop.       Comments: No  edema noted to bilateral lower extremities.  Pulmonary:      Effort: Pulmonary effort is normal. No respiratory distress.      Breath sounds: Normal breath sounds. No wheezing or rales.   Abdominal:      General: There is no distension.      Palpations: Abdomen is soft. There is no mass.      Tenderness: There is no abdominal tenderness.   Musculoskeletal: Normal range of motion.      Comments: Ambulates without difficulty.   Skin:     General: Skin is warm and dry.      Capillary Refill: Capillary refill takes less than 2 seconds.      Nails: There is no clubbing.        Comments: Capillary refill is less than three seconds to bilateral nail beds.   Neurological:      Mental Status: She is alert and oriented to person, place, and time.   Psychiatric:         Mood and Affect: Mood normal.          DIAGNOSTICS:    XR CHEST PA AND LATERAL - March 2020  COMPARISON: 10/10/2019 .  FINDINGS:  The 2 view study was done 3/24/2020 10:09 AM.  The lungs are clear.  There is no pleural fluid.   The heart size is normal.  The mediastinal contours are normal.   The  pulmonary arteries are normal.   There is no pneumothorax.  Thoracic degenerative spurring is stable      IMPRESSION: No acute chest finding     In office spirometry - August 2018  FVC of 2.48 L, 91% predicted, FEV1 of 2.12 L or 102% predicted for an FEV1/FVC ratio of 85%.  -Baseline spirometric volumes are normal.  Expiratory flow volume loop is normal.  Inspiratory flow volume loop is normal.     ASSESSMENT/PLAN     1. Sleep apnea  · Patient is positive for habitual snoring, witnessed episodes of apnea, waking up with coughing sensation, nocturnal arousals, daytime somnolence, difficulty staying awake with driving, daytime naps, problems with concentration memory, restless legs, nasal congestion, somniloquy, and nocturia.  · Patient has never had sleep study in the past.  · Patient had recent NSTEMI with observation during hospitalization of oxygen desaturation  during sleep.  · Mallampati class 3 airway with macroglossia and neck circumference of 38 cm.  Scalloped tongue noted.  · [place] Sleepiness Scale of 14 today in clinic.  · Relevant comorbidities including coronary artery disease, atrial fibrillation, hypertension, and obesity.    2. Chronic cough  · Unknown etiology at this point.  · Likely multifactorial considering the patient's history of GERD, rhinitis, potential vocal cord dysfunction, and potential obstructive sleep apnea.  · Chest x-ray from March 2020 unremarkable.  · Spirometry from 2018 demonstrating lung volumes within normal limits.    3. GERD  · History of reflux treated with omeprazole 20 mg daily.  · Feels the medication is effective in helping with abdominal pain.  Does not feel it is effective with cough.    4. Vocal cord dysfunction  · Symptoms consistent with vocal cord dysfunction.  · Previous recommendations to follow up with speech therapy; patient was unable to complete this.     5. Allergic rhinitis  · Feels symptoms of cough and throat clearing may be related to allergies; mold.   · Has been tested for allergies in the past, unremarkable.     6. Obesity  · Body mass index is 33.73 kg/m².  · 86.4 kg    PLAN:  · The patient and I discussed, in detail, about the multiple possible etiologies of chronic cough.  The patient was informed that her chronic cough may be a result of her GERD, potential vocal cord dysfunction, and/or rhinitis.  She was informed that it is unlikely of pulmonary etiology considering her normal spirometry and chest x-ray.  The patient and I decided on the below interventions:  · Nasal saline rinses followed by Flonase or nasal court.  · Omeprazole 20 mg daily.  She is instructed to take this 30 minutes prior to her biggest meal of the day.  · Possibly discontinuing losartan.  I will message the patient's primary regarding this.  · Potential referral to speech therapy due to potential vocal cord dysfunction.  · Further  pursuing a sleep study.  · As a last resort, the patient may further pursue a CT scan of the chest.    · Considering the patient's history, symptoms, exam, relevant comorbidities, at and observed episodes of hypoxia during sleep during her most recent hospitalization, I recommend the patient undergo a sleep study to further evaluate sleep-related breathing disorder.  · The patient prefers to undergo home sleep study at this time.  · All questions and concerns were answered here today.  The patient is encouraged to call with any additional questions or concerns in the meantime.  · Follow-up in 3 months.   patient is A&O x0  on the bipap

## 2025-02-28 NOTE — DISCHARGE NOTE PROVIDER - NSDCMRMEDTOKEN_GEN_ALL_CORE_FT
amLODIPine 5 mg oral tablet: 1 tab(s) orally once a day  Lexapro 20 mg oral tablet: 1 tab(s) orally once a day  losartan 100 mg oral tablet: 1 tab(s) orally once a day   Advair Diskus 500 mcg-50 mcg/inh inhalation powder: 1 inhaled 2 times a day  ipratropium-albuterol 0.5 mg-2.5 mg/3 mL inhalation solution: 3 milliliter(s) inhaled every 6 hours as needed for  bronchospasm  Lexapro 20 mg oral tablet: 1 tab(s) orally once a day  Mucinex 600 mg oral tablet, extended release: 1 tab(s) orally 2 times a day  predniSONE 10 mg oral tablet: 2 tab(s) orally once a day Take prednisone 20mg for 2 days then 10mg for 2 days then stop  Spiriva HandiHaler 18 mcg inhalation capsule: 1 cap(s) inhaled once a day

## 2025-02-28 NOTE — DISCHARGE NOTE PROVIDER - HOSPITAL COURSE
79-year-old female with past medical history of hypertension, chronic high bicarb (COPD?/smoker?), who came from urgent care for hypoxia in the setting of nasal congestion, cough, increasing shortness of breath over the past week. Patient was initially seen in urgent care to be evaluated with negative rapid flu and COVID. Notably patient was satting 80% on room air there and improved on 3 L nasal cannula. While in ED pt was taken off room air for a little and satted in 80's and was obtunded and agitated and SOB. Lorazepam 2 mg given. BIPAP placed with improvement in SOB but sleepy.  ED interview: Patient denies any fevers, nausea/vomiting, chest pain, urinary symptoms, leg swelling. My interview: Pt not waking up to sternal rub so could not conduct interview, likely from ativan vs co2 narcosis.      VS all wnl except hypoxia, then hypotensive started on levo  trop 35, creat 1.6, bnp 2979 with no baseline, bicarb 31 chronic, lacate 2.8, vbg pH 7.3 to 7.1 to 7.2, pco2 from 50 to 118 to 96, RSV positive  CT head and abd negative  CXR hyperinflated  received LR bolus, levo, lorazepam, calcium gluconate, duoneb and dexa    Pt was initially admitted to CCU, was started on levophed and eventually weaned off and downgraded to medicine floors when pt became more stable. Pt was found to be RSV positive that resulted in undiagnosed COPD exacerbation, septic shock and acute on chronic hypercapnic respiratory failure. Pt was treated with supportive care and oxygen and AVAPs while on the floor and her condition improved. Pt will be discharged on home O2 and AVAPs. Pt is stable and ready for discharge.     Discussion of discharge plan of care, including discharge diagnoses, medication reconciliation, and follow-ups was conducted with Dr. Jerez on 2/28/25 and discharge was approved.           79-year-old female with past medical history of hypertension, chronic high bicarb (COPD?/smoker?), who came from urgent care for hypoxia in the setting of nasal congestion, cough, increasing shortness of breath over the past week. Patient was initially seen in urgent care to be evaluated with negative rapid flu and COVID. Notably patient was satting 80% on room air there and improved on 3 L nasal cannula. While in ED pt was taken off room air for a little and satted in 80's and was obtunded and agitated and SOB. Lorazepam 2 mg given. BIPAP placed with improvement in SOB but sleepy.  ED interview: Patient denies any fevers, nausea/vomiting, chest pain, urinary symptoms, leg swelling. My interview: Pt not waking up to sternal rub so could not conduct interview, likely from ativan vs co2 narcosis.      VS all wnl except hypoxia, then hypotensive started on levo  trop 35, creat 1.6, bnp 2979 with no baseline, bicarb 31 chronic, lacate 2.8, vbg pH 7.3 to 7.1 to 7.2, pco2 from 50 to 118 to 96, RSV positive  CT head and abd negative  CXR hyperinflated  received LR bolus, levo, lorazepam, calcium gluconate, duoneb and dexa    Pt was initially admitted to CCU, was started on levophed and eventually weaned off and downgraded to medicine floors when pt became more stable. Pt was found to be RSV positive that resulted in undiagnosed COPD exacerbation, septic shock and acute on chronic hypercapnic respiratory failure. Pt was treated with supportive care and oxygen and AVAPs while on the floor and her condition improved. Pt will be discharged on home O2 and AVAPs. Pt is stable and ready for discharge.     Discussion of discharge plan of care, including discharge diagnoses, medication reconciliation, and follow-ups was conducted with Dr. Atul Jones on 3/3/25 and discharge was approved.           79-year-old female with past medical history of hypertension, chronic high bicarb (COPD?/smoker?), who came from urgent care for hypoxia in the setting of nasal congestion, cough, increasing shortness of breath over the past week. Patient was initially seen in urgent care to be evaluated with negative rapid flu and COVID. Notably patient was satting 80% on room air there and improved on 3 L nasal cannula. While in ED pt was taken off room air for a little and satted in 80's and was obtunded and agitated and SOB. Lorazepam 2 mg given. BIPAP placed with improvement in SOB but sleepy.  ED interview: Patient denies any fevers, nausea/vomiting, chest pain, urinary symptoms, leg swelling. My interview: Pt not waking up to sternal rub so could not conduct interview, likely from ativan vs co2 narcosis.        Pt was initially admitted to CCU, was started on levophed and eventually weaned off and downgraded to medicine floors when pt became more stable. Pt was found to be RSV positive that resulted in undiagnosed COPD exacerbation, septic shock and acute on chronic hypercapnic respiratory failure. Pt was treated with supportive care and oxygen and AVAPs while on the floor and her condition improved. Pt will be discharged on home O2 and AVAPs. Pt is stable and ready for discharge.         This is an 79-year-old female with past medical history of hypertension, chronic high bicarb (COPD?/smoker?), who came from urgent care for hypoxia in the setting of nasal congestion, cough, increasing shortness of breath over the past week.    1. Acute hypoxemic and hypercapneic respiratory failure  and septic shock secondary to acute COPD exacerbation triggered by RSV   - Admit to medicine   - ABG: showing hypercapnea  - Septic shock due to RSV resolved   - Blood cx:NTD   - Cont steroid taper . DC on prednisone taper   - Cont inhalers . DC on inhalers  - DC on oxygen   - patient will require home oxygen and AVAP , which have been ordered    2. CKD STage 3A - stable, ?etiology    3. moderate malnutrition  -  ensure plus high protein 1 can twice daily      4. Hypertension   - hold home losartan 100mg daily until seen by PMD  - hold home amlodipine 5mg daily until seen by PMD     Discussion of discharge plan of care, including discharge diagnoses, medication reconciliation, and follow-ups was conducted with Dr. Atul Jones on 3/3/25 and discharge was approved.           79-year-old female with past medical history of hypertension, chronic high bicarb (COPD?/smoker?), who came from urgent care for hypoxia in the setting of nasal congestion, cough, increasing shortness of breath over the past week. Patient was initially seen in urgent care to be evaluated with negative rapid flu and COVID. Notably patient was satting 80% on room air there and improved on 3 L nasal cannula. While in ED pt was taken off room air for a little and satted in 80's and was obtunded and agitated and SOB. Lorazepam 2 mg given. BIPAP placed with improvement in SOB but sleepy.  ED interview: Patient denies any fevers, nausea/vomiting, chest pain, urinary symptoms, leg swelling. My interview: Pt not waking up to sternal rub so could not conduct interview, likely from ativan vs co2 narcosis.        Pt was initially admitted to CCU, was started on levophed and eventually weaned off and downgraded to medicine floors when pt became more stable. Pt was found to be RSV positive that resulted in undiagnosed COPD exacerbation, septic shock and acute on chronic hypercapnic respiratory failure. Pt was treated with supportive care and oxygen and AVAPs while on the floor and her condition improved. Pt will be discharged on home O2 and AVAPs. Pt is stable and ready for discharge.       1. Acute hypoxemic and hypercapneic respiratory failure  and septic shock secondary to acute COPD exacerbation triggered by RSV   - Admit to medicine   - ABG: showing hypercapnea  - Septic shock due to RSV resolved   - Blood cx:NTD   - Cont steroid taper . DC on prednisone taper   - Cont inhalers . DC on inhalers  - DC on oxygen   - patient will require home oxygen and AVAP , which have been ordered    2. CKD STage 3A - stable, ?etiology    3. moderate malnutrition  -  ensure plus high protein 1 can twice daily      4. Hypertension   - hold home losartan 100mg daily until seen by PMD  - hold home amlodipine 5mg daily until seen by PMD     Discussion of discharge plan of care, including discharge diagnoses, medication reconciliation, and follow-ups was conducted with Dr. Atul Jones on 3/3/25 and discharge was approved.

## 2025-02-28 NOTE — DISCHARGE NOTE PROVIDER - DETAILS OF MALNUTRITION DIAGNOSIS/DIAGNOSES
This patient has been assessed with a concern for Malnutrition and was treated during this hospitalization for the following Nutrition diagnosis/diagnoses:     -  02/23/2025: Moderate protein-calorie malnutrition

## 2025-02-28 NOTE — DISCHARGE NOTE NURSING/CASE MANAGEMENT/SOCIAL WORK - NSDCPEFALRISK_GEN_ALL_CORE
For information on Fall & Injury Prevention, visit: [email or web address] OR  [email or web address] OR  https://www.cdc.gov/steadi/patient.html

## 2025-02-28 NOTE — DISCHARGE NOTE NURSING/CASE MANAGEMENT/SOCIAL WORK - PATIENT PORTAL LINK FT
You can access the FollowMyHealth Patient Portal offered by [facility] by registering at the following website: [email or web address]. By joining [employer]’s FollowMyHealth portal, you will also be able to view your health information using other applications (apps) compatible with our system.

## 2025-03-01 LAB
ANION GAP SERPL CALC-SCNC: 10 MMOL/L — SIGNIFICANT CHANGE UP (ref 7–14)
BUN SERPL-MCNC: 29 MG/DL — HIGH (ref 10–20)
CALCIUM SERPL-MCNC: 8.8 MG/DL — SIGNIFICANT CHANGE UP (ref 8.4–10.5)
CHLORIDE SERPL-SCNC: 96 MMOL/L — LOW (ref 98–110)
CO2 SERPL-SCNC: 33 MMOL/L — HIGH (ref 17–32)
CREAT SERPL-MCNC: 1.4 MG/DL — SIGNIFICANT CHANGE UP (ref 0.7–1.5)
EGFR: 38 ML/MIN/1.73M2 — LOW
EGFR: 38 ML/MIN/1.73M2 — LOW
GLUCOSE SERPL-MCNC: 91 MG/DL — SIGNIFICANT CHANGE UP (ref 70–99)
HCT VFR BLD CALC: 40.3 % — SIGNIFICANT CHANGE UP (ref 37–47)
HGB BLD-MCNC: 11.9 G/DL — LOW (ref 12–16)
MAGNESIUM SERPL-MCNC: 1.8 MG/DL — SIGNIFICANT CHANGE UP (ref 1.8–2.4)
MCHC RBC-ENTMCNC: 26.6 PG — LOW (ref 27–31)
MCHC RBC-ENTMCNC: 29.5 G/DL — LOW (ref 32–37)
MCV RBC AUTO: 90 FL — SIGNIFICANT CHANGE UP (ref 81–99)
NRBC BLD AUTO-RTO: 0 /100 WBCS — SIGNIFICANT CHANGE UP (ref 0–0)
PLATELET # BLD AUTO: 292 K/UL — SIGNIFICANT CHANGE UP (ref 130–400)
PMV BLD: 9.4 FL — SIGNIFICANT CHANGE UP (ref 7.4–10.4)
POTASSIUM SERPL-MCNC: 4.5 MMOL/L — SIGNIFICANT CHANGE UP (ref 3.5–5)
POTASSIUM SERPL-SCNC: 4.5 MMOL/L — SIGNIFICANT CHANGE UP (ref 3.5–5)
RBC # BLD: 4.48 M/UL — SIGNIFICANT CHANGE UP (ref 4.2–5.4)
RBC # FLD: 13.3 % — SIGNIFICANT CHANGE UP (ref 11.5–14.5)
SODIUM SERPL-SCNC: 139 MMOL/L — SIGNIFICANT CHANGE UP (ref 135–146)
WBC # BLD: 15.17 K/UL — HIGH (ref 4.8–10.8)
WBC # FLD AUTO: 15.17 K/UL — HIGH (ref 4.8–10.8)

## 2025-03-01 PROCEDURE — 99232 SBSQ HOSP IP/OBS MODERATE 35: CPT

## 2025-03-01 RX ADMIN — ENOXAPARIN SODIUM 30 MILLIGRAM(S): 100 INJECTION SUBCUTANEOUS at 11:04

## 2025-03-01 RX ADMIN — IPRATROPIUM BROMIDE AND ALBUTEROL SULFATE 3 MILLILITER(S): .5; 2.5 SOLUTION RESPIRATORY (INHALATION) at 13:49

## 2025-03-01 RX ADMIN — IPRATROPIUM BROMIDE AND ALBUTEROL SULFATE 3 MILLILITER(S): .5; 2.5 SOLUTION RESPIRATORY (INHALATION) at 01:26

## 2025-03-01 RX ADMIN — PREDNISONE 30 MILLIGRAM(S): 20 TABLET ORAL at 05:11

## 2025-03-01 RX ADMIN — IPRATROPIUM BROMIDE AND ALBUTEROL SULFATE 3 MILLILITER(S): .5; 2.5 SOLUTION RESPIRATORY (INHALATION) at 19:54

## 2025-03-01 RX ADMIN — Medication 1 APPLICATION(S): at 11:04

## 2025-03-01 RX ADMIN — Medication 1 DOSE(S): at 22:18

## 2025-03-01 RX ADMIN — IPRATROPIUM BROMIDE AND ALBUTEROL SULFATE 3 MILLILITER(S): .5; 2.5 SOLUTION RESPIRATORY (INHALATION) at 09:27

## 2025-03-01 RX ADMIN — TIOTROPIUM BROMIDE INHALATION SPRAY 2 PUFF(S): 3.12 SPRAY, METERED RESPIRATORY (INHALATION) at 09:26

## 2025-03-01 RX ADMIN — Medication 1 DOSE(S): at 08:07

## 2025-03-01 RX ADMIN — Medication 40 MILLIGRAM(S): at 11:04

## 2025-03-01 NOTE — PROGRESS NOTE ADULT - SUBJECTIVE AND OBJECTIVE BOX
patient seen and examined earlier today.  she reports feeling good without SOB on oxygen 4 L via NP, using BIPAP at night.  when ambulating desaturates down to 83-84% oxygen.  no fever. no chills.  awake, alert.  Vital Signs Last 24 Hrs  T(C): 36.7 (01 Mar 2025 11:34), Max: 36.8 (01 Mar 2025 05:53)  T(F): 98.1 (01 Mar 2025 11:34), Max: 98.3 (01 Mar 2025 05:53)  HR: 95 (01 Mar 2025 11:34) (65 - 101)  BP: 115/73 (01 Mar 2025 11:34) (111/71 - 122/72)  BP(mean): --  RR: 18 (01 Mar 2025 11:34) (18 - 18)  SpO2: 93% (01 Mar 2025 11:34) (93% - 94%)    conj pink, no jaundice  neck supple. no JVD  lungs no expiratory wheezing.  decreased air entry throughout. no crackles.  heart regular rate and rhythm. no murmur or gallop  abd BS pos. soft nontender  extremities good skin turgor. no edema.                    11.9   15.17 )-----------( 292      ( 01 Mar 2025 07:27 )             40.3     03-01    139  |  96[L]  |  29[H]  ----------------------------<  91  4.5   |  33[H]  |  1.4    Ca    8.8      01 Mar 2025 07:27  Mg     1.8     03-01    TPro  6.2  /  Alb  3.5  /  TBili  0.3  /  DBili  x   /  AST  17  /  ALT  18  /  AlkPhos  63  02-28    MEDICATIONS  (STANDING):  albuterol/ipratropium for Nebulization 3 milliLiter(s) Nebulizer every 6 hours  chlorhexidine 2% Cloths 1 Application(s) Topical daily  enoxaparin Injectable 30 milliGRAM(s) SubCutaneous every 24 hours  fluticasone propionate/ salmeterol 500-50 MICROgram(s) Diskus 1 Dose(s) Inhalation two times a day  pantoprazole  Injectable 40 milliGRAM(s) IV Push daily  predniSONE   Tablet 30 milliGRAM(s) Oral daily  tiotropium 2.5 MICROgram(s) Inhaler 2 Puff(s) Inhalation daily

## 2025-03-01 NOTE — PROGRESS NOTE ADULT - SUBJECTIVE AND OBJECTIVE BOX
NEPHROLOGY FOLLOW UP NOTE    awaiting home arrangements   on O2 NC      PAST MEDICAL & SURGICAL HISTORY:    Allergies:  penicillins (Unknown)  sulfa drugs (Unknown)    Home Medications Reviewed    SOCIAL HISTORY:  Denies ETOH,Smoking,   FAMILY HISTORY:        REVIEW OF SYSTEMS:  All other review of systems is negative unless indicated above.    PHYSICAL EXAM:  Constitutional: NAD  HEENT: anicteric sclera, oropharynx clear, MMM  Neck: No JVD  Respiratory: decreased BS b/l  Cardiovascular: S1, S2, RRR  Gastrointestinal: BS+, soft, NT/ND  Extremities: No cyanosis or clubbing. No peripheral edema  Neurological: A/O x 3, no focal deficits  Psychiatric: Normal mood, normal affect  : No CVA tenderness. No willett.   Skin: No rashes    Hospital Medications:   MEDICATIONS  (STANDING):  albuterol/ipratropium for Nebulization 3 milliLiter(s) Nebulizer every 6 hours  chlorhexidine 2% Cloths 1 Application(s) Topical daily  enoxaparin Injectable 30 milliGRAM(s) SubCutaneous every 24 hours  fluticasone propionate/ salmeterol 500-50 MICROgram(s) Diskus 1 Dose(s) Inhalation two times a day  pantoprazole  Injectable 40 milliGRAM(s) IV Push daily  predniSONE   Tablet 30 milliGRAM(s) Oral daily  tiotropium 2.5 MICROgram(s) Inhaler 2 Puff(s) Inhalation daily        VITALS:  T(F): 98.1 (03-01-25 @ 11:34), Max: 98.3 (03-01-25 @ 05:53)  HR: 95 (03-01-25 @ 11:34)  BP: 115/73 (03-01-25 @ 11:34)  RR: 18 (03-01-25 @ 11:34)  SpO2: 93% (03-01-25 @ 11:34)  Wt(kg): --    02-27 @ 07:01  -  02-28 @ 07:00  --------------------------------------------------------  IN: 0 mL / OUT: 400 mL / NET: -400 mL          LABS:  03-01    139  |  96[L]  |  29[H]  ----------------------------<  91  4.5   |  33[H]  |  1.4    Ca    8.8      01 Mar 2025 07:27  Mg     1.8     03-01    TPro  6.2  /  Alb  3.5  /  TBili  0.3  /  DBili      /  AST  17  /  ALT  18  /  AlkPhos  63  02-28                          11.9   15.17 )-----------( 292      ( 01 Mar 2025 07:27 )             40.3       Urine Studies:  Urinalysis Basic - ( 01 Mar 2025 07:27 )    Color:  / Appearance:  / SG:  / pH:   Gluc: 91 mg/dL / Ketone:   / Bili:  / Urobili:    Blood:  / Protein:  / Nitrite:    Leuk Esterase:  / RBC:  / WBC    Sq Epi:  / Non Sq Epi:  / Bacteria:           RADIOLOGY & ADDITIONAL STUDIES:

## 2025-03-01 NOTE — PROGRESS NOTE ADULT - ASSESSMENT
COPD (not previously diagnosed according to patient) seems severe or very severe given hypoxemia and hypercapnia  Acute respiratory failure with hypoxemia and hypercapnia  CKD STage 3A - stable, ?etiology      Plan:  patient will require home oxygen and AVAP , which ahve been ordered  patient lives alone, will benefit from sTR, is weak and cannot ambulate without hypoxia and tachycardia, also needs 1 person assist and there is no one available to be home with her.   spoke to [facility] and hcp and several facilities selected and now await application and authorization

## 2025-03-02 LAB
ANION GAP SERPL CALC-SCNC: 9 MMOL/L — SIGNIFICANT CHANGE UP (ref 7–14)
BUN SERPL-MCNC: 29 MG/DL — HIGH (ref 10–20)
CALCIUM SERPL-MCNC: 9 MG/DL — SIGNIFICANT CHANGE UP (ref 8.4–10.5)
CHLORIDE SERPL-SCNC: 98 MMOL/L — SIGNIFICANT CHANGE UP (ref 98–110)
CO2 SERPL-SCNC: 34 MMOL/L — HIGH (ref 17–32)
CREAT SERPL-MCNC: 1.4 MG/DL — SIGNIFICANT CHANGE UP (ref 0.7–1.5)
EGFR: 38 ML/MIN/1.73M2 — LOW
EGFR: 38 ML/MIN/1.73M2 — LOW
GLUCOSE SERPL-MCNC: 89 MG/DL — SIGNIFICANT CHANGE UP (ref 70–99)
HCT VFR BLD CALC: 40.2 % — SIGNIFICANT CHANGE UP (ref 37–47)
HGB BLD-MCNC: 12 G/DL — SIGNIFICANT CHANGE UP (ref 12–16)
MAGNESIUM SERPL-MCNC: 1.9 MG/DL — SIGNIFICANT CHANGE UP (ref 1.8–2.4)
MCHC RBC-ENTMCNC: 26.7 PG — LOW (ref 27–31)
MCHC RBC-ENTMCNC: 29.9 G/DL — LOW (ref 32–37)
MCV RBC AUTO: 89.5 FL — SIGNIFICANT CHANGE UP (ref 81–99)
NRBC BLD AUTO-RTO: 0 /100 WBCS — SIGNIFICANT CHANGE UP (ref 0–0)
PLATELET # BLD AUTO: 318 K/UL — SIGNIFICANT CHANGE UP (ref 130–400)
PMV BLD: 9.5 FL — SIGNIFICANT CHANGE UP (ref 7.4–10.4)
POTASSIUM SERPL-MCNC: 5 MMOL/L — SIGNIFICANT CHANGE UP (ref 3.5–5)
POTASSIUM SERPL-SCNC: 5 MMOL/L — SIGNIFICANT CHANGE UP (ref 3.5–5)
RBC # BLD: 4.49 M/UL — SIGNIFICANT CHANGE UP (ref 4.2–5.4)
RBC # FLD: 13.3 % — SIGNIFICANT CHANGE UP (ref 11.5–14.5)
SODIUM SERPL-SCNC: 141 MMOL/L — SIGNIFICANT CHANGE UP (ref 135–146)
WBC # BLD: 14.05 K/UL — HIGH (ref 4.8–10.8)
WBC # FLD AUTO: 14.05 K/UL — HIGH (ref 4.8–10.8)

## 2025-03-02 PROCEDURE — 99232 SBSQ HOSP IP/OBS MODERATE 35: CPT

## 2025-03-02 RX ADMIN — PREDNISONE 30 MILLIGRAM(S): 20 TABLET ORAL at 05:05

## 2025-03-02 RX ADMIN — TIOTROPIUM BROMIDE INHALATION SPRAY 2 PUFF(S): 3.12 SPRAY, METERED RESPIRATORY (INHALATION) at 09:22

## 2025-03-02 RX ADMIN — ENOXAPARIN SODIUM 30 MILLIGRAM(S): 100 INJECTION SUBCUTANEOUS at 09:46

## 2025-03-02 RX ADMIN — IPRATROPIUM BROMIDE AND ALBUTEROL SULFATE 3 MILLILITER(S): .5; 2.5 SOLUTION RESPIRATORY (INHALATION) at 13:53

## 2025-03-02 RX ADMIN — IPRATROPIUM BROMIDE AND ALBUTEROL SULFATE 3 MILLILITER(S): .5; 2.5 SOLUTION RESPIRATORY (INHALATION) at 01:10

## 2025-03-02 RX ADMIN — Medication 1 DOSE(S): at 23:24

## 2025-03-02 RX ADMIN — IPRATROPIUM BROMIDE AND ALBUTEROL SULFATE 3 MILLILITER(S): .5; 2.5 SOLUTION RESPIRATORY (INHALATION) at 09:22

## 2025-03-02 RX ADMIN — Medication 1 APPLICATION(S): at 12:54

## 2025-03-02 RX ADMIN — IPRATROPIUM BROMIDE AND ALBUTEROL SULFATE 3 MILLILITER(S): .5; 2.5 SOLUTION RESPIRATORY (INHALATION) at 19:41

## 2025-03-02 RX ADMIN — Medication 40 MILLIGRAM(S): at 12:51

## 2025-03-02 RX ADMIN — Medication 1 DOSE(S): at 08:00

## 2025-03-02 NOTE — PROGRESS NOTE ADULT - SUBJECTIVE AND OBJECTIVE BOX
patient seen and examined .  she reports feeling good without SOB on oxygen 4 L via NP, using BIPAP at night.   no fever. no chills.  awake, alert.    Vital Signs Last 24 Hrs  T(C): 36.5 (02 Mar 2025 04:27), Max: 36.7 (01 Mar 2025 11:34)  T(F): 97.7 (02 Mar 2025 04:27), Max: 98.1 (01 Mar 2025 11:34)  HR: 78 (02 Mar 2025 04:27) (78 - 95)  BP: 94/57 (02 Mar 2025 04:27) (94/57 - 115/73)  BP(mean): --  RR: 18 (02 Mar 2025 04:27) (18 - 18)  SpO2: 95% (02 Mar 2025 04:27) (93% - 96%)  (on oxygen via NP 4L)    conj pink, no jaundice  neck supple. no JVD  lungs no expiratory wheezing.  decreased air entry throughout. no crackles.  heart regular rate and rhythm. no murmur or gallop  abd BS pos. soft nontender  extremities good skin turgor. no edema.   	                        12.0   14.05 )-----------( 318      ( 02 Mar 2025 06:57 )             40.2     03-02    141  |  98  |  29[H]  ----------------------------<  89  5.0   |  34[H]  |  1.4    Ca    9.0      02 Mar 2025 06:57  Mg     1.9     03-02      MEDICATIONS  (STANDING):  albuterol/ipratropium for Nebulization 3 milliLiter(s) Nebulizer every 6 hours  chlorhexidine 2% Cloths 1 Application(s) Topical daily  enoxaparin Injectable 30 milliGRAM(s) SubCutaneous every 24 hours  fluticasone propionate/ salmeterol 500-50 MICROgram(s) Diskus 1 Dose(s) Inhalation two times a day  pantoprazole  Injectable 40 milliGRAM(s) IV Push daily  predniSONE   Tablet 30 milliGRAM(s) Oral daily  tiotropium 2.5 MICROgram(s) Inhaler 2 Puff(s) Inhalation daily

## 2025-03-02 NOTE — PROGRESS NOTE ADULT - ASSESSMENT
COPD (not previously diagnosed according to patient) seems severe or very severe given hypoxemia and hypercapnia  Acute respiratory failure with hypoxemia and hypercapnia  CKD STage 3A - stable, ?etiology  moderate malnutrition    Plan:  patient will require home oxygen and AVAP , which have been ordered  patient lives alone, will benefit from STR, is weak and cannot ambulate without hypoxia and tachycardia, also needs 1 person assist and there is no one available to be home with her.   spoke to patient and hcp - accepted at [facility], awaiting authorization  continue prednisone taper 10 mg every 4 days, will start 20 mg po daily tomorrow, then 10 mg to start 3/7 and on 3/11 DC  follow nutrition recommendations - ensure plus high protein 1 can twice daily

## 2025-03-02 NOTE — PROGRESS NOTE ADULT - ASSESSMENT
COSME, better  CKD stage 3   hyperkalemia, better  ARF improving  RSV / COPD exac  ex-smoker    plan:    cont off losartan  cont off amlodipine  monitor BP's  trend renal function and lytes  prednisone taper / mdi / nebs / ppi  physical therapy  [place] [facility] [facility]

## 2025-03-02 NOTE — PROGRESS NOTE ADULT - SUBJECTIVE AND OBJECTIVE BOX
NEPHROLOGY FOLLOW UP NOTE    + BARAHONA  decision for STR  on O2 NC      PAST MEDICAL & SURGICAL HISTORY:    Allergies:  penicillins (Unknown)  sulfa drugs (Unknown)    Home Medications Reviewed    SOCIAL HISTORY:  Denies ETOH,Smoking,   FAMILY HISTORY:        REVIEW OF SYSTEMS:  All other review of systems is negative unless indicated above.    PHYSICAL EXAM:  Constitutional: NAD  HEENT: anicteric sclera, oropharynx clear, MMM  Neck: No JVD  Respiratory: decreased BS b/l  Cardiovascular: S1, S2, RRR  Gastrointestinal: BS+, soft, NT/ND  Extremities: No cyanosis or clubbing. No peripheral edema  Neurological: A/O x 3, no focal deficits  Psychiatric: Normal mood, normal affect  : No CVA tenderness. No willett.   Skin: No rashes    Hospital Medications:   MEDICATIONS  (STANDING):  albuterol/ipratropium for Nebulization 3 milliLiter(s) Nebulizer every 6 hours  chlorhexidine 2% Cloths 1 Application(s) Topical daily  enoxaparin Injectable 30 milliGRAM(s) SubCutaneous every 24 hours  fluticasone propionate/ salmeterol 500-50 MICROgram(s) Diskus 1 Dose(s) Inhalation two times a day  pantoprazole  Injectable 40 milliGRAM(s) IV Push daily  predniSONE   Tablet 30 milliGRAM(s) Oral daily  tiotropium 2.5 MICROgram(s) Inhaler 2 Puff(s) Inhalation daily        VITALS:  T(F): 97.6 (03-02-25 @ 11:33), Max: 97.7 (03-02-25 @ 04:27)  HR: 92 (03-02-25 @ 11:33)  BP: 107/67 (03-02-25 @ 11:33)  RR: 18 (03-02-25 @ 11:33)  SpO2: 97% (03-02-25 @ 11:33)  Wt(kg): --    03-01 @ 07:01  -  03-02 @ 07:00  --------------------------------------------------------  IN: 0 mL / OUT: 300 mL / NET: -300 mL          LABS:  03-02    141  |  98  |  29[H]  ----------------------------<  89  5.0   |  34[H]  |  1.4    Ca    9.0      02 Mar 2025 06:57  Mg     1.9     03-02                            12.0   14.05 )-----------( 318      ( 02 Mar 2025 06:57 )             40.2       Urine Studies:  Urinalysis Basic - ( 02 Mar 2025 06:57 )    Color:  / Appearance:  / SG:  / pH:   Gluc: 89 mg/dL / Ketone:   / Bili:  / Urobili:    Blood:  / Protein:  / Nitrite:    Leuk Esterase:  / RBC:  / WBC    Sq Epi:  / Non Sq Epi:  / Bacteria:           RADIOLOGY & ADDITIONAL STUDIES:

## 2025-03-03 VITALS
OXYGEN SATURATION: 93 % | HEART RATE: 90 BPM | SYSTOLIC BLOOD PRESSURE: 98 MMHG | DIASTOLIC BLOOD PRESSURE: 63 MMHG | RESPIRATION RATE: 17 BRPM | TEMPERATURE: 98 F

## 2025-03-03 LAB
ANION GAP SERPL CALC-SCNC: 8 MMOL/L — SIGNIFICANT CHANGE UP (ref 7–14)
BUN SERPL-MCNC: 31 MG/DL — HIGH (ref 10–20)
CALCIUM SERPL-MCNC: 9.3 MG/DL — SIGNIFICANT CHANGE UP (ref 8.4–10.5)
CHLORIDE SERPL-SCNC: 99 MMOL/L — SIGNIFICANT CHANGE UP (ref 98–110)
CO2 SERPL-SCNC: 33 MMOL/L — HIGH (ref 17–32)
CREAT SERPL-MCNC: 1.3 MG/DL — SIGNIFICANT CHANGE UP (ref 0.7–1.5)
EGFR: 41 ML/MIN/1.73M2 — LOW
EGFR: 41 ML/MIN/1.73M2 — LOW
GLUCOSE SERPL-MCNC: 85 MG/DL — SIGNIFICANT CHANGE UP (ref 70–99)
HCT VFR BLD CALC: 38.6 % — SIGNIFICANT CHANGE UP (ref 37–47)
HGB BLD-MCNC: 11.8 G/DL — LOW (ref 12–16)
MAGNESIUM SERPL-MCNC: 1.9 MG/DL — SIGNIFICANT CHANGE UP (ref 1.8–2.4)
MCHC RBC-ENTMCNC: 27.1 PG — SIGNIFICANT CHANGE UP (ref 27–31)
MCHC RBC-ENTMCNC: 30.6 G/DL — LOW (ref 32–37)
MCV RBC AUTO: 88.5 FL — SIGNIFICANT CHANGE UP (ref 81–99)
NRBC BLD AUTO-RTO: 0 /100 WBCS — SIGNIFICANT CHANGE UP (ref 0–0)
PLATELET # BLD AUTO: 346 K/UL — SIGNIFICANT CHANGE UP (ref 130–400)
PMV BLD: 9.4 FL — SIGNIFICANT CHANGE UP (ref 7.4–10.4)
POTASSIUM SERPL-MCNC: 4.3 MMOL/L — SIGNIFICANT CHANGE UP (ref 3.5–5)
POTASSIUM SERPL-SCNC: 4.3 MMOL/L — SIGNIFICANT CHANGE UP (ref 3.5–5)
RBC # BLD: 4.36 M/UL — SIGNIFICANT CHANGE UP (ref 4.2–5.4)
RBC # FLD: 13.2 % — SIGNIFICANT CHANGE UP (ref 11.5–14.5)
SODIUM SERPL-SCNC: 140 MMOL/L — SIGNIFICANT CHANGE UP (ref 135–146)
WBC # BLD: 15.79 K/UL — HIGH (ref 4.8–10.8)
WBC # FLD AUTO: 15.79 K/UL — HIGH (ref 4.8–10.8)

## 2025-03-03 PROCEDURE — 99239 HOSP IP/OBS DSCHRG MGMT >30: CPT

## 2025-03-03 RX ORDER — IPRATROPIUM BROMIDE AND ALBUTEROL SULFATE .5; 2.5 MG/3ML; MG/3ML
3 SOLUTION RESPIRATORY (INHALATION)
Qty: 0 | Refills: 0 | DISCHARGE
Start: 2025-03-03

## 2025-03-03 RX ORDER — LOSARTAN POTASSIUM 100 MG/1
1 TABLET, FILM COATED ORAL
Refills: 0 | DISCHARGE

## 2025-03-03 RX ORDER — AMLODIPINE BESYLATE 10 MG/1
1 TABLET ORAL
Refills: 0 | DISCHARGE

## 2025-03-03 RX ORDER — TIOTROPIUM BROMIDE INHALATION SPRAY 3.12 UG/1
1 SPRAY, METERED RESPIRATORY (INHALATION)
Qty: 1 | Refills: 0
Start: 2025-03-03 | End: 2025-04-01

## 2025-03-03 RX ORDER — DEXTROMETHORPHAN HBR, GUAIFENESIN 200 MG/10ML
1 LIQUID ORAL
Qty: 60 | Refills: 0
Start: 2025-03-03 | End: 2025-04-01

## 2025-03-03 RX ORDER — PREDNISONE 20 MG/1
2 TABLET ORAL
Qty: 8 | Refills: 0
Start: 2025-03-03 | End: 2025-03-06

## 2025-03-03 RX ADMIN — IPRATROPIUM BROMIDE AND ALBUTEROL SULFATE 3 MILLILITER(S): .5; 2.5 SOLUTION RESPIRATORY (INHALATION) at 14:22

## 2025-03-03 RX ADMIN — Medication 1 APPLICATION(S): at 11:57

## 2025-03-03 RX ADMIN — TIOTROPIUM BROMIDE INHALATION SPRAY 2 PUFF(S): 3.12 SPRAY, METERED RESPIRATORY (INHALATION) at 07:52

## 2025-03-03 RX ADMIN — PREDNISONE 30 MILLIGRAM(S): 20 TABLET ORAL at 05:32

## 2025-03-03 RX ADMIN — ENOXAPARIN SODIUM 30 MILLIGRAM(S): 100 INJECTION SUBCUTANEOUS at 11:53

## 2025-03-03 RX ADMIN — Medication 40 MILLIGRAM(S): at 11:53

## 2025-03-03 RX ADMIN — IPRATROPIUM BROMIDE AND ALBUTEROL SULFATE 3 MILLILITER(S): .5; 2.5 SOLUTION RESPIRATORY (INHALATION) at 07:52

## 2025-03-03 RX ADMIN — Medication 1 DOSE(S): at 11:51

## 2025-03-03 NOTE — PROGRESS NOTE ADULT - PROVIDER SPECIALTY LIST ADULT
CCU
CCU
Internal Medicine
Nephrology
Pulmonology
Internal Medicine
Internal Medicine
Nephrology
Nephrology
CCU
Critical Care
Hospitalist
Internal Medicine
Internal Medicine
Nephrology
Nephrology
Pulmonology
Hospitalist
Internal Medicine
Nephrology
Internal Medicine
Internal Medicine

## 2025-03-03 NOTE — PROGRESS NOTE ADULT - SUBJECTIVE AND OBJECTIVE BOX
SUBJECTIVE/OVERNIGHT EVENTS  Today is hospital day 10d. This morning patient was seen and examined at bedside, resting comfortably in bed. No acute or major events overnight.    MEDICATIONS  STANDING MEDICATIONS  albuterol/ipratropium for Nebulization 3 milliLiter(s) Nebulizer every 6 hours  chlorhexidine 2% Cloths 1 Application(s) Topical daily  enoxaparin Injectable 30 milliGRAM(s) SubCutaneous every 24 hours  fluticasone propionate/ salmeterol 500-50 MICROgram(s) Diskus 1 Dose(s) Inhalation two times a day  pantoprazole  Injectable 40 milliGRAM(s) IV Push daily  tiotropium 2.5 MICROgram(s) Inhaler 2 Puff(s) Inhalation daily    PRN MEDICATIONS  bisacodyl 5 milliGRAM(s) Oral every 12 hours PRN    VITALS  T(F): 97.3 (03-03-25 @ 04:47), Max: 97.3 (03-03-25 @ 04:47)  HR: 83 (03-03-25 @ 04:47) (83 - 91)  BP: 132/76 (03-03-25 @ 04:47) (116/72 - 132/76)  RR: 18 (03-03-25 @ 04:47) (18 - 18)  SpO2: 96% (03-03-25 @ 04:47) (96% - 99%)    PHYSICAL EXAM  GENERAL: NAD, lying in bed comfortably  HEAD:  Atraumatic, normocephalic  EYES: EOMI, conjunctiva and sclera clear  NECK: Supple, no JVD  HEART: Regular rate and rhythm, no murmurs  LUNGS: Unlabored respirations. Mild wheezing B/L, on NC  ABDOMEN: Soft, nontender, nondistended, +BS  EXTREMITIES: 2+ peripheral pulses bilaterally. No clubbing, cyanosis, or edema  NERVOUS SYSTEM:  A&Ox3, no focal deficits   SKIN: No rashes or lesions    LABS             11.8   15.79 )-----------( 346      ( 03-03-25 @ 06:15 )             38.6     140  |  99  |  31  -------------------------<  85   03-03-25 @ 06:15  4.3  |  33  |  1.3    Ca      9.3     03-03-25 @ 06:15  Mg     1.9     03-03-25 @ 06:15          Urinalysis Basic - ( 03 Mar 2025 06:15 )    Color: x / Appearance: x / SG: x / pH: x  Gluc: 85 mg/dL / Ketone: x  / Bili: x / Urobili: x   Blood: x / Protein: x / Nitrite: x   Leuk Esterase: x / RBC: x / WBC x   Sq Epi: x / Non Sq Epi: x / Bacteria: x          IMAGING

## 2025-03-03 NOTE — PROGRESS NOTE ADULT - NUTRITIONAL ASSESSMENT
This patient has been assessed with a concern for Malnutrition and has been determined to have a diagnosis/diagnoses of Moderate protein-calorie malnutrition.    This patient is being managed with:   Diet DASH/TLC-  Sodium & Cholesterol Restricted  Supplement Feeding Modality:  Oral  Ensure Plus High Protein Cans or Servings Per Day:  1       Frequency:  Two Times a day  Entered: Feb 24 2025  2:06PM  
This patient has been assessed with a concern for Malnutrition and has been determined to have a diagnosis/diagnoses of Moderate protein-calorie malnutrition.    This patient is being managed with:   Diet Regular-  DASH/TLC {Sodium & Cholesterol Restricted} (DASH)  Entered: Feb 22 2025 11:53AM

## 2025-03-03 NOTE — PROGRESS NOTE ADULT - ASSESSMENT
"  VS:   BP (!) 178/75   Pulse 69   Temp 96.7  F (35.9  C) (Oral)   Resp 18   Ht 1.727 m (5' 8\")   Wt 80.1 kg (176 lb 9.4 oz)   SpO2 94%   BMI 26.85 kg/m       Hydralazine administered for BP   Output:   Patient has voided x4 this AM after removal of garcia   Output is watermelon pink. PVR's were 250 and 112 - MD is aware and was ok with this.    0745 Output 350 -   0815 Output 200 -    Lungs Lung sounds clear and equal bilaterally, encouraging coughing and deep breathing   Activity:   Patient is ambulatory and ambulating around room with A1 due to occasional unsteadiness.   Skin: WDL x small abrasion inside right thigh.   Pain:   denies   Neuro/CMS:   Intact - tremors due to parkinsons   Diet:   Regular diet - tolerated breakfast with no nausea   LDA:   Garcia and PIV discontinued   Plan:   Discharge to home, care assistance from wife   Additional Info:   Discharge education provided. Has follow up visit scheduled.     0900 - All goals met including PVR and tolerating intake  1100 - All goals met including PVR and tolerating intake        Prior to discharge patient was educated on urinating post garcia insertion, education provided surrounding pink coloring of urine for 7-10 days, and to call if bright red bleeding occurs. Education provided around activity and not performing strenuous activity or lifting heavy objects greater than 10 pounds. Wife was present on phone for education.     Patient wheeled off unit by transport at 12:40 pm and was picked up by wife.   " COSME, better  CKD stage 3   hyperkalemia, better  ARF improving  RSV / COPD exac  ex-smoker    plan:    cont off losartan  cont off amlodipine  monitor BP's  trend renal function and lytes  prednisone taper / mdi / nebs / ppi  physical therapy  dc planning to [place]

## 2025-03-03 NOTE — PROGRESS NOTE ADULT - ASSESSMENT
79-year-old female with past medical history of hypertension, chronic high bicarb (COPD?/smoker?), who came from urgent care for hypoxia in the setting of nasal congestion, cough, increasing shortness of breath over the past week. Patient was initially seen in urgent care to be evaluated with negative rapid flu and COVID. Notably patient was satting 80% on room air there and improved on 3 L nasal cannula. While in ED pt was taken off room air for a little and satted in 80's and was obtunded and agitated and SOB. Lorazepam 2 mg given. BIPAP placed with improvement in SOB but sleepy.  ED interview: Patient denies any fevers, nausea/vomiting, chest pain, urinary symptoms, leg swelling. My interview: Pt not waking up to sternal rub so could not conduct interview, likely from ativan vs co2 narcosis.      #RSV positive   #acute on chronic hypercapnic respiratory failure  #septic shock 2/2 viral infection   #possible viral bronchitis  #hx of tobacco use  #undiagnosed COPD  #obtunded and agitated on admission  - Xray Chest (02.21.25) No radiographic evidence of acute cardiopulmonary disease.  - CT Angio Chest PE Protocol w/ IV Cont (02.20.25) No evidence of acute pulmonary embolism or acute thoracic pathology.  - CT Head No Cont (02.20.25): Partially motion degraded study. No definite evidence of acute intracranial pathology.  - ED vitals: trop 35, creat 1.6, bnp 2979, bicarb 31 chronic, lacate 2.8, vbg pH 7.3 to 7.1 to 7.2, pco2 from 50 to 118 to 96, RSV positive  - weaned off BiPAP   - weaned off Levo   - s/p solumedrol 40mg q12h  - c/w Duoneb  - MRSA negative   - BCx negative   - on d/c will need ICS/LABA, LAMA, PRN albuterol  - outpatient pulmonary f/u after d/c  - repeat CXR 2-3 months   - cw Prednisone taper, 40 mg 2/23 and taper 10 mg Q4d  - Advair 500 and Spiriva  - O2 via NC to keep spo2 more than 88%  - repeat ABG improved  - repeat CXR 2/27: No focal parenchymal opacities, pleural effusions, or pneumothorax.  - AVAP on DC: settings per pulm: EPAP 8 IPAP 14-20 goal  Backup Rate 12  - wean O2 as juancho  - pt satting 88 on 3L, 85 on RA  - send pt with home O2    #hx HTN   - hold home losartan 100mg daily iso shock + levo/ low BP    - hold home amlodipine 5mg daily   - monitor BP     #CKD  - Cr 1.6 (around baseline)   - monitor I&O  - monitor renal function, electrolytes    #?hx HFpEF  - TTE: EF of 61%. Mild (grade 1) diastolic dysfunction  - no volume overload   - trop 35 > 25     #MISC  DVT ppx: Lovenox   GI ppx: PPI   Diet: DASH  Activity: AAT     #PENDING  - cw Prednisone taper, 40 mg 2/23 and taper 10 mg Q4d  - Advair 500 and Spiriva  - O2 via NC to keep spo2 more than 88%  - repeat ABG improved  - AVAP on DC: settings per pulm: EPAP 8 IPAP 14-20 goal  Backup Rate 12  - wean O2 as juancho  - pt satting 88 on 3L, 85 on RA  - send pt with home O2

## 2025-03-03 NOTE — PROGRESS NOTE ADULT - SUBJECTIVE AND OBJECTIVE BOX
NEPHROLOGY FOLLOW UP NOTE    tolerating PO  bp's still on lower side  no dizziness   BARAHONA      PAST MEDICAL & SURGICAL HISTORY:    Allergies:  penicillins (Unknown)  sulfa drugs (Unknown)    Home Medications Reviewed    SOCIAL HISTORY:  Denies ETOH,Smoking,   FAMILY HISTORY:        REVIEW OF SYSTEMS:  All other review of systems is negative unless indicated above.    PHYSICAL EXAM:  Constitutional: NAD  HEENT: anicteric sclera, oropharynx clear, MMM  Neck: No JVD  Respiratory: decreased BS b/l  Cardiovascular: S1, S2, RRR  Gastrointestinal: BS+, soft, NT/ND  Extremities: No cyanosis or clubbing. No peripheral edema  Neurological: A/O x 3, no focal deficits  Psychiatric: Normal mood, normal affect  : No CVA tenderness. No willett.   Skin: No rashes    Hospital Medications:   MEDICATIONS  (STANDING):  albuterol/ipratropium for Nebulization 3 milliLiter(s) Nebulizer every 6 hours  chlorhexidine 2% Cloths 1 Application(s) Topical daily  enoxaparin Injectable 30 milliGRAM(s) SubCutaneous every 24 hours  fluticasone propionate/ salmeterol 500-50 MICROgram(s) Diskus 1 Dose(s) Inhalation two times a day  pantoprazole  Injectable 40 milliGRAM(s) IV Push daily  tiotropium 2.5 MICROgram(s) Inhaler 2 Puff(s) Inhalation daily        VITALS:  T(F): 97.9 (03-03-25 @ 12:12), Max: 97.9 (03-03-25 @ 12:12)  HR: 90 (03-03-25 @ 12:12)  BP: 98/63 (03-03-25 @ 12:12)  RR: 17 (03-03-25 @ 12:12)  SpO2: 93% (03-03-25 @ 12:12)  Wt(kg): --    03-01 @ 07:01  -  03-02 @ 07:00  --------------------------------------------------------  IN: 0 mL / OUT: 300 mL / NET: -300 mL    03-02 @ 07:01  -  03-03 @ 07:00  --------------------------------------------------------  IN: 0 mL / OUT: 400 mL / NET: -400 mL          LABS:  03-03    140  |  99  |  31[H]  ----------------------------<  85  4.3   |  33[H]  |  1.3    Ca    9.3      03 Mar 2025 06:15  Mg     1.9     03-03                            11.8   15.79 )-----------( 346      ( 03 Mar 2025 06:15 )             38.6       Urine Studies:  Urinalysis Basic - ( 03 Mar 2025 06:15 )    Color:  / Appearance:  / SG:  / pH:   Gluc: 85 mg/dL / Ketone:   / Bili:  / Urobili:    Blood:  / Protein:  / Nitrite:    Leuk Esterase:  / RBC:  / WBC    Sq Epi:  / Non Sq Epi:  / Bacteria:           RADIOLOGY & ADDITIONAL STUDIES:

## 2025-03-03 NOTE — PROGRESS NOTE ADULT - SUBJECTIVE AND OBJECTIVE BOX
SAMMIE JESSICA  79y  [facility] 3A 005 B      Patient is a 79y old  Female who presents with a chief complaint of hypoxia rsv (02 Mar 2025 13:35)      INTERVAL HPI/OVERNIGHT EVENTS:        REVIEW OF SYSTEMS:        FAMILY HISTORY:    T(C): 36.3 (03-03-25 @ 04:47), Max: 36.4 (03-02-25 @ 11:33)  HR: 83 (03-03-25 @ 04:47) (83 - 92)  BP: 132/76 (03-03-25 @ 04:47) (107/67 - 132/76)  RR: 18 (03-03-25 @ 04:47) (18 - 18)  SpO2: 96% (03-03-25 @ 04:47) (96% - 99%)  Wt(kg): --Vital Signs Last 24 Hrs  T(C): 36.3 (03 Mar 2025 04:47), Max: 36.4 (02 Mar 2025 11:33)  T(F): 97.3 (03 Mar 2025 04:47), Max: 97.6 (02 Mar 2025 11:33)  HR: 83 (03 Mar 2025 04:47) (83 - 92)  BP: 132/76 (03 Mar 2025 04:47) (107/67 - 132/76)  BP(mean): --  RR: 18 (03 Mar 2025 04:47) (18 - 18)  SpO2: 96% (03 Mar 2025 04:47) (96% - 99%)    Parameters below as of 02 Mar 2025 20:00  Patient On (Oxygen Delivery Method): nasal cannula        PHYSICAL EXAM:  GENERAL: NAD, well-groomed, well-developed  HEAD:  Atraumatic, Normocephalic  EYES: EOMI, PERRLA, conjunctiva and sclera clear  ENMT: No tonsillar erythema, exudates, or enlargement; Moist mucous membranes, Good dentition, No lesions  NECK: Supple, No JVD, Normal thyroid  NERVOUS SYSTEM:  Alert & Oriented X3, Good concentration; Motor Strength 5/5 B/L upper and lower extremities; DTRs 2+ intact and symmetric  PULM: Clear to auscultation bilaterally  CARDIAC: Regular rate and rhythm; No murmurs, rubs, or gallops  GI: Soft, Nontender, Nondistended; Bowel sounds present  EXTREMITIES:  2+ Peripheral Pulses, No clubbing, cyanosis, or edema  LYMPH: No lymphadenopathy noted  SKIN: No rashes or lesions    Consultant(s) Notes Reviewed:  [x ] YES  [ ] NO  Care Discussed with Consultants/Other Providers [ x] YES  [ ] NO    LABS:                            11.8   15.79 )-----------( 346      ( 03 Mar 2025 06:15 )             38.6   03-03    140  |  99  |  31[H]  ----------------------------<  85  4.3   |  33[H]  |  1.3    Ca    9.3      03 Mar 2025 06:15  Mg     1.9     03-03              albuterol/ipratropium for Nebulization 3 milliLiter(s) Nebulizer every 6 hours  bisacodyl 5 milliGRAM(s) Oral every 12 hours PRN  chlorhexidine 2% Cloths 1 Application(s) Topical daily  enoxaparin Injectable 30 milliGRAM(s) SubCutaneous every 24 hours  fluticasone propionate/ salmeterol 500-50 MICROgram(s) Diskus 1 Dose(s) Inhalation two times a day  pantoprazole  Injectable 40 milliGRAM(s) IV Push daily  tiotropium 2.5 MICROgram(s) Inhaler 2 Puff(s) Inhalation daily    This is an 79-year-old female with past medical history of hypertension, chronic high bicarb (COPD?/smoker?), who came from urgent care for hypoxia in the setting of nasal congestion, cough, increasing shortness of breath over the past week.    1. Acute hypoxemic and hypercapneic respiratory failure secondary to acute COPD exacerbation triggered by RSV   - Admit to medicine   - ABG: showing hypercapnea  - Cont steroid taper   - Cont inhalers   - patient will require home oxygen and AVAP , which have been ordered          2. CKD STage 3A - stable, ?etiology        3. moderate malnutrition  -  ensure plus high protein 1 can twice daily    4. DVT/GI px    SAMMIE JESSICA  79y  [facility]-N 3A 005 B      Patient is a 79y old  Female who presents with a chief complaint of hypoxia rsv (02 Mar 2025 13:35)      INTERVAL HPI/OVERNIGHT EVENTS:  Patient feels better  her breathing is improving   she is in agreement with dc to rehab   no other events noted         FAMILY HISTORY:    T(C): 36.3 (03-03-25 @ 04:47), Max: 36.4 (03-02-25 @ 11:33)  HR: 83 (03-03-25 @ 04:47) (83 - 92)  BP: 132/76 (03-03-25 @ 04:47) (107/67 - 132/76)  RR: 18 (03-03-25 @ 04:47) (18 - 18)  SpO2: 96% (03-03-25 @ 04:47) (96% - 99%)  Wt(kg): --Vital Signs Last 24 Hrs  T(C): 36.3 (03 Mar 2025 04:47), Max: 36.4 (02 Mar 2025 11:33)  T(F): 97.3 (03 Mar 2025 04:47), Max: 97.6 (02 Mar 2025 11:33)  HR: 83 (03 Mar 2025 04:47) (83 - 92)  BP: 132/76 (03 Mar 2025 04:47) (107/67 - 132/76)  BP(mean): --  RR: 18 (03 Mar 2025 04:47) (18 - 18)  SpO2: 96% (03 Mar 2025 04:47) (96% - 99%)    Parameters below as of 02 Mar 2025 20:00  Patient On (Oxygen Delivery Method): nasal cannula        PHYSICAL EXAM:  GENERAL: NAD, well-groomed, well-developed  PULM: Clear to auscultation bilaterally  CARDIAC: Regular rate and rhythm  GI: Soft, Nontender, Nondistended; Bowel sounds present  EXTREMITIES:  2+ Peripheral Pulses    Consultant(s) Notes Reviewed:  [x ] YES  [ ] NO  Care Discussed with Consultants/Other Providers [ x] YES  [ ] NO    LABS:                            11.8   15.79 )-----------( 346      ( 03 Mar 2025 06:15 )             38.6   03-03    140  |  99  |  31[H]  ----------------------------<  85  4.3   |  33[H]  |  1.3    Ca    9.3      03 Mar 2025 06:15  Mg     1.9     03-03              albuterol/ipratropium for Nebulization 3 milliLiter(s) Nebulizer every 6 hours  bisacodyl 5 milliGRAM(s) Oral every 12 hours PRN  chlorhexidine 2% Cloths 1 Application(s) Topical daily  enoxaparin Injectable 30 milliGRAM(s) SubCutaneous every 24 hours  fluticasone propionate/ salmeterol 500-50 MICROgram(s) Diskus 1 Dose(s) Inhalation two times a day  pantoprazole  Injectable 40 milliGRAM(s) IV Push daily  tiotropium 2.5 MICROgram(s) Inhaler 2 Puff(s) Inhalation daily    This is an 79-year-old female with past medical history of hypertension, chronic high bicarb (COPD?/smoker?), who came from urgent care for hypoxia in the setting of nasal congestion, cough, increasing shortness of breath over the past week.    This is an 79-year-old female with past medical history of hypertension, chronic high bicarb (COPD?/smoker?), who came from urgent care for hypoxia in the setting of nasal congestion, cough, increasing shortness of breath over the past week.    1. Acute hypoxemic and hypercapneic respiratory failure  and septic shock secondary to acute COPD exacerbation triggered by RSV   - Admit to medicine   - ABG: showing hypercapnea  - Septic shock due to RSV resolved   - Blood cx:NTD   - Cont steroid taper . DC on prednisone taper   - Cont inhalers . DC on inhalers  - DC on oxygen   - patient will require home oxygen and AVAP , which have been ordered    2. CKD STage 3A - stable, ?etiology    3. moderate malnutrition  -  ensure plus high protein 1 can twice daily      4. Hypertension   - hold home losartan 100mg daily until seen by PMD  - hold home amlodipine 5mg daily until seen by PMD     5. DVT/GI px

## 2025-03-03 NOTE — PROGRESS NOTE ADULT - REASON FOR ADMISSION
hypoxia rsv

## 2025-03-06 DIAGNOSIS — G92.8 OTHER TOXIC ENCEPHALOPATHY: ICD-10-CM

## 2025-03-06 DIAGNOSIS — B97.4 RESPIRATORY SYNCYTIAL VIRUS AS THE CAUSE OF DISEASES CLASSIFIED ELSEWHERE: ICD-10-CM

## 2025-03-06 DIAGNOSIS — J96.22 ACUTE AND CHRONIC RESPIRATORY FAILURE WITH HYPERCAPNIA: ICD-10-CM

## 2025-03-06 DIAGNOSIS — J96.01 ACUTE RESPIRATORY FAILURE WITH HYPOXIA: ICD-10-CM

## 2025-03-06 DIAGNOSIS — R65.21 SEVERE SEPSIS WITH SEPTIC SHOCK: ICD-10-CM

## 2025-03-06 DIAGNOSIS — E44.0 MODERATE PROTEIN-CALORIE MALNUTRITION: ICD-10-CM

## 2025-03-06 DIAGNOSIS — N18.31 CHRONIC KIDNEY DISEASE, STAGE 3A: ICD-10-CM

## 2025-03-06 DIAGNOSIS — I12.9 HYPERTENSIVE CHRONIC KIDNEY DISEASE WITH STAGE 1 THROUGH STAGE 4 CHRONIC KIDNEY DISEASE, OR UNSPECIFIED CHRONIC KIDNEY DISEASE: ICD-10-CM

## 2025-03-06 DIAGNOSIS — J44.1 CHRONIC OBSTRUCTIVE PULMONARY DISEASE WITH (ACUTE) EXACERBATION: ICD-10-CM

## 2025-03-06 DIAGNOSIS — E87.5 HYPERKALEMIA: ICD-10-CM

## 2025-03-06 DIAGNOSIS — A41.89 OTHER SPECIFIED SEPSIS: ICD-10-CM

## 2025-04-14 ENCOUNTER — APPOINTMENT (OUTPATIENT)
Facility: CLINIC | Age: 80
End: 2025-04-14
Payer: MEDICARE

## 2025-04-14 VITALS
WEIGHT: 120 LBS | SYSTOLIC BLOOD PRESSURE: 150 MMHG | HEIGHT: 62 IN | DIASTOLIC BLOOD PRESSURE: 80 MMHG | HEART RATE: 90 BPM | OXYGEN SATURATION: 96 % | BODY MASS INDEX: 22.08 KG/M2

## 2025-04-14 DIAGNOSIS — J44.9 CHRONIC OBSTRUCTIVE PULMONARY DISEASE, UNSPECIFIED: ICD-10-CM

## 2025-04-14 PROCEDURE — 99214 OFFICE O/P EST MOD 30 MIN: CPT

## 2025-04-14 RX ORDER — FLUTICASONE FUROATE, UMECLIDINIUM BROMIDE AND VILANTEROL TRIFENATATE 200; 62.5; 25 UG/1; UG/1; UG/1
200-62.5-25 POWDER RESPIRATORY (INHALATION)
Qty: 60 | Refills: 0 | Status: ACTIVE | COMMUNITY
Start: 2025-04-14 | End: 1900-01-01

## 2025-07-24 NOTE — PATIENT PROFILE ADULT - NSPROGENPREVTRANSF_GEN_A_NUR
Physical Exam    Vital Signs: I have reviewed the initial vital signs.  Constitutional: appears stated age, no acute distress  Eyes: Conjunctiva pink, Sclera clear, PERRLA, EOMI.  ENT: OP is clear without exudates, normal dentition, normal gingival, tongue without swelling, TMs clear b/l, nasal turbinates without erythema or discharge, no lymphadenopathy.  Cardiovascular: S1 and S2, regular rate, regular rhythm, well-perfused extremities, radial pulses equal and 2+, pedal pulses 2+ and equal  Respiratory: unlabored respiratory effort, clear to auscultation bilaterally no wheezing, rales and rhonchi  Gastrointestinal: soft, non-tender abdomen, no pulsatile mass, normal bowl sounds  Musculoskeletal: right knee ttp rom limited 2/2 pain, pelvis stable   Integumentary: diffuse ecchymosis to face and bridge of nose, lac to right side of face and nasal bridge, ecchymosis to right knee  Neurologic: awake, alert, cranial nerves II-XII grossly intact, extremities’ motor and sensory functions grossly intact patient is A&O x0  on the bipap

## 2025-08-13 ENCOUNTER — APPOINTMENT (OUTPATIENT)
Dept: PULMONOLOGY | Facility: CLINIC | Age: 80
End: 2025-08-13